# Patient Record
Sex: FEMALE | Race: WHITE | NOT HISPANIC OR LATINO | Employment: UNEMPLOYED | URBAN - METROPOLITAN AREA
[De-identification: names, ages, dates, MRNs, and addresses within clinical notes are randomized per-mention and may not be internally consistent; named-entity substitution may affect disease eponyms.]

---

## 2017-01-31 ENCOUNTER — HOSPITAL ENCOUNTER (OUTPATIENT)
Dept: BEHAVIORAL HEALTH | Facility: CLINIC | Age: 27
Discharge: HOME | End: 2017-01-31
Attending: SOCIAL WORKER | Admitting: SOCIAL WORKER
Payer: COMMERCIAL

## 2017-01-31 VITALS — BODY MASS INDEX: 21.16 KG/M2 | HEIGHT: 62 IN | WEIGHT: 115 LBS

## 2017-01-31 PROCEDURE — H0001 ALCOHOL AND/OR DRUG ASSESS: HCPCS

## 2017-01-31 RX ORDER — BUPRENORPHINE AND NALOXONE 8; 2 MG/1; MG/1
1 FILM, SOLUBLE BUCCAL; SUBLINGUAL 3 TIMES DAILY
COMMUNITY

## 2017-01-31 ASSESSMENT — ANXIETY QUESTIONNAIRES
2. NOT BEING ABLE TO STOP OR CONTROL WORRYING: NEARLY EVERY DAY
7. FEELING AFRAID AS IF SOMETHING AWFUL MIGHT HAPPEN: MORE THAN HALF THE DAYS
3. WORRYING TOO MUCH ABOUT DIFFERENT THINGS: MORE THAN HALF THE DAYS
4. TROUBLE RELAXING: NEARLY EVERY DAY
1. FEELING NERVOUS, ANXIOUS, OR ON EDGE: NEARLY EVERY DAY
GAD7 TOTAL SCORE: 16
5. BEING SO RESTLESS THAT IT IS HARD TO SIT STILL: SEVERAL DAYS
6. BECOMING EASILY ANNOYED OR IRRITABLE: MORE THAN HALF THE DAYS

## 2017-01-31 ASSESSMENT — PAIN SCALES - GENERAL: PAINLEVEL: NO PAIN (0)

## 2017-01-31 NOTE — PROGRESS NOTES
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 program? NA    Criteria for Diagnosis     Criteria for Diagnosis  DSM-5 Criteria for Substance Use Disorder  Instructions: Determine whether the client currently meets the criteria for Substance Use Disorder using the diagnostic criteria in the DSM-V pp.481-587. Current means during the most recent 12 months outside a facility that controls access to substances    Category of Substance Severity (ICD-10 Code / DSM 5 Code)     Alcohol Use Disorder Mild  (F10.10) (305.00)   Cannabis Use Disorder Severe   (F12.20) (304.30)   Hallucinogen Use Disorder NA   Inhalant Use Disorder NA   Opioid Use Disorder Severe   (F11.20) (304.00)    Sedative, Hypnotic, or Anxiolytic Use Disorder NA   Stimulant Related Disorder Severe   (F14.20) (304.20) Cocaine   Tobacco Use Disorder Mild    (Z72.0) (305.1)   Other (or unknown) Substance Use Disorder NA       Collateral Contact Summary   Number of contacts made: 2    Contact with referring person:  Yes.    If court related records were reviewed, summarize here: NA    Information from collateral contacts supported/largely agreed with information from the client and associated risk  ratings.      Rule 25 Assessment Summary and Plan   's Recommendation    Client is recommended to attend the [place] outpatient chemical dependency and DBT program.    UPDATE 2/9/17: Client is recommended to attend the [facility] or [facility] treatment program.      Collateral Contacts     Name:    Sol Rosado   Relationship:    [facility]   Phone Number:    [phone] Releases:    Yes     Voicemail left 3:22pm.      Collateral Contacts     Name:    Waqar Pedersen   Relationship:    boyfriend   Phone Number:    [phone]   Releases:    Yes     Known her 3 years.  I think the benzos are more than she has said.  Her life is absolutely falling apart.  I am just worried about her.  I actually called her PO which is part of why we are here.  Has not been able to bounce back since relapse of heroin (2015)    ollateral Contacts      A problematic pattern of alcohol/drug use leading to clinically significant impairment or distress, as manifested by at least two of the following, occurring within a 12-month period:    Alcohol/drug is often taken in larger amounts or over a longer period than was intended.  There is a persistent desire or unsuccessful efforts to cut down or control alcohol/drug use  A great deal of time is spent in activities necessary to obtain alcohol, use alcohol, or recover from its effects.  Continued alcohol use despite having persistent or recurrent social or interpersonal problems caused or exacerbated by the effects of alcohol/drug.  Important social, occupational, or recreational activities are given up or reduced because of alcohol/drug use.  Alcohol/drug use is continued despite knowledge of having a persistent or recurrent physical or psychological problem that is likely to have been caused or exacerbated by alcohol.  Tolerance, as defined by either of the following: A need for markedly increased amounts of alcohol/drug to achieve  intoxication or desired effect. and A markedly diminished effect with continued use of the same amount of alcohol/drug.  Withdrawal, as manifested by either of the following: The characteristic withdrawal syndrome for alcohol/drug (refer to Criteria A and B of the criteria set for alcohol/drug withdrawal). and Alcohol/drug (or a closely related substance, such as a benzodiazepine) is taken to relieve or avoid withdrawal symptoms.      Specify if: In early remission:  After full criteria for alcohol/drug use disorder were previously met, none of the criteria for alcohol/drug use disorder have been met for at least 3 months but for less than 12 months (with the exception that Criterion A4,  Craving or a strong desire or urge to use alcohol/drug  may be met).     In sustained remission:   After full criteria for alcohol use disorder were previously met, non of the criteria for alcohol/drug use disorder have been met at any time during a period of 12 months or longer (with the exception that Criterion A4,  Craving or strong desire or urge to use alcohol/drug  may be met).   Specify if:   This additional specifier is used if the individual is in an environment where access to alcohol is restricted.    Mild: Presence of 2-3 symptoms    Moderate: Presence of 4-5 symptoms    Severe: Presence of 6 or more symptoms

## 2017-01-31 NOTE — PROGRESS NOTES
[address] [address]               ADULT CD ASSESSMENT      Additional Clinical Questions - Outpatient    Patient Name: Jennifer Stanford  Cell Phone:   Home: [phone] (home)    Mobile:   No relevant phone numbers on file.       Email:  [email or web address]  Emergency Contact: Waqar Pedersen   Tel: [phone]    ________________________________________________________________________      The patient is  Living with a partner    With which race do you identify? White    Please list your family members and if they are living or , i.e. (grandparents, parents, step-parents, adoptive parents, number of siblings, half-siblings, etc.)     Mother   Living Father Living   No Step-mother   NA No Step-father NA   Maternal Grandmother    Fraternal Grandmother    Maternal Grandfather    Living Fraternal Grandfather    1 Sister(s) Living No Brother(s)   NA   1 Half-sister(s)   Living 2 Half-brother(s) Living             Who raised you? (parents, grandparents, adoptive parents, step-parents, etc.)    Both Parents    Have any of your family members or significant others had problems with mental illness or substance abuse?  Please explain.    My parents both used all growing up.    Do you have any children or Stepchildren? No    Are you being investigated by Child Protection Services? n/a    Do you have a child protection worker, probation office or ? No    How would you describe your current finances?  In serious debt    If you are having problems, (unpaid bills, bankruptcy, IRS problems) please explain:  Yes, please explain: I ran up credit while not being employed recently.  Also, my partner has had to loan money to make our bills.    If working or a student are you able to function appropriately in that setting? Yes    Describe your preferred learning style:  by watching someone else demonstrate    What personal  strengths do you have that can help you get sober?  Good support.  God's love.  Determination, memories of exactly what I need to be doing again.    Do you currently self-administer your medications?  Yes    Have you ever:    Had to lie to people important to you about how much you neal?     No     Felt the need to bet more and more money?      No     Attempted treatment for a gambling problem?        No     Touched or fondled someone else inappropriately, or forced them to have sex with you against their will?       No     Are you or have you ever been a registered sex offender?        No     Is there any history of sexual abuse in your family?        No     Jen[place] obsessed by your sexual behavior (having sex with many partners, masturbating often, using pornography often?        No     Received therapy or stayed in the hospital for mental health problems?        No     Hurt yourself (cutting, burning or hitting yourself)?        No     Purged, binged or restricted yourself as a way to control your weight?      No       Are you on a special diet?       No       Do you have any concerns regarding your nutritional status?        No       Have you had any appetite changes in the last 3 months?        Yes, If yes explain: lost weight, stress       Have you had any weight loss or weight gain in the last 3 months?  If yes, how much gain or loss:     If weight patient gains more than 10 lbs or loses more than 10 lbs, refer to program RN /  Attending Physician for assessment.    Yes, If yes explain: loss        Was the patient informed of BMI?      Normal, No Intervention   Yes     Do you have any dental problems?        No     Lived through any trauma or stressful events?        Yes, If yes explain: many losses of people (overdoses), .     In the past month, have you had any of the following symptoms related to the trauma listed above? (Dreams, intense memories, flashbacks, physical reactions, etc.)         Yes,  If yes explain: sudden emotional flashbacks     Believed that people are spying on you, or that someone was plotting against you or trying to hurt you?       No     Believed that someone was reading your mind or could hear your thoughts or that you could actually read someone's mind, hear what another person was thinking?       No     Believed that someone or some force outside of yourself put thoughts in your mind that were not your own, or made you act in a way that was not your usual self?  Or have you ever thought you were possessed?         No     Believed that you were being sent special messages through the TV, radio or newspaper?         No     [place] things other people couldn't hear, such as voices?         No     Had visions when you were awake?  Or have you ever seen things other people couldn't see?       No         Suicide Screening Questions:    1. Are you feeling hopeless about the present/future?   No   2. Have you ever had thoughts about taking your life?   No   3. When did you have these thoughts? NA   4. Do you have any current intent or active desire to take your life?   No   5. Do you have a plan to take your life?    No   6. Have you ever made a suicide attempt?   No   7. Do you have access to pills, guns or other methods to kill yourself?   No       Risk Status - Use as Guide/Example    Ideation - Active  Thoughts of suicide Intent to follow  Through on suicide Plan for completing  suicide    Yes No Yes No Yes No   Emergent X  X  X    Urgent / Non-Emergent X  X   X   Non-Urgent X   X  X   No Current / Active Risk (Past 6 Months)  X  X  X   Jennifer Stanford No No No       Additional Risk Factors: Tendency to be socially isolated and/or cut off from the support of others  A recent death of someone close to the patient and/or unresolved grief and loss issues  Substance abuse   Protective Factors:  Having easy access to supportive family members  Having cultural, Mosque or spiritual  "beliefs that discourage suicide     Risk Status:    Emergent? No  Urgent / Non-Emergent?  No  Present / Non- Urgent? No      No Current Risk? Yes, Evaluation Counselors - Document in Epic / SBAR to counselor \"No identified risk\" and Treatment Counselors - Assess weekly in progress notes under Dimension 3 and summarize in Discharge / Treatment summary under Dimension 3.    Additional information to support suicide risk rating: See Above    Mental Status Assessment    Physical Appearance/Attire:  Appears stated age and Attire appropriate to age/situation  Hygiene:  well groomed  Eye Contact:  at examiner  Speech:  regular  Speech Volume:  regular  Speech Quality: fluid  Cognitive/Perceptual:  reality based  Cognition:  memory intact   Judgment:  intact  Insight:  intact  Orientation:  time, place, person and situation  Thought:  logical   Hallucinations:  none  General Behavioral Tone:  cooperative  Psychomotor Activity:  no problem noted  Gait:  no problem  Mood:  appropriate  Affect:  congruence/appropriate    Counselor Notes: NA    Criteria for Diagnosis  DSM-5 Criteria for Substance Abuse    305.00 (F10.10) Alcohol Use Disorder Mild  304.30 (F12.20) Cannabis Use Disorder Severe  304.00 (F11.20) Opioid Use Disorder Severe  304.20 (F14.20) Cocaine Use Disorder Severe    LEVEL OF CARE    Intoxication and Withdrawal: 0  Biomedical:  0  Emotional and Behavioral:  2  Readiness to Change:  2  Relapse Potential: 3  Recovery Environmental:  3    Initial problem list:    The patient has poor coping skills    Patient/Client is willing to follow treatment recommendations.  Yes    Agustina Del Real [facility]       Vulnerable Adult Checklist for OUTPATIENTS     1.  Do you have a physical, emotional or mental infirmity or dysfunction?       No    2.  Does this issue impair your ability to provide for your own care without help, including providing yourself with food, shelter, clothing, healthcare or supervision?       No    3.  " Because of this issue, I need assistance to protect myself from maltreatment by others.      No    Based on the above information:    This person is not a functional Vulnerable Adult according to [place] Statute 626.5572 subdivision 21.

## 2017-02-01 ASSESSMENT — ANXIETY QUESTIONNAIRES: GAD7 TOTAL SCORE: 16

## 2017-02-01 ASSESSMENT — PATIENT HEALTH QUESTIONNAIRE - PHQ9: SUM OF ALL RESPONSES TO PHQ QUESTIONS 1-9: 17

## 2017-02-01 NOTE — PROGRESS NOTES
CHEMICAL DEPENDENCY ASSESSMENT      DATE OF EVALUATION:  01/31/2017   EVALUATION COUNSELOR:  Agustina Del Real [facility]   CLIENT'S ADDRESS:  [address] [place] [address].   TELEPHONE:  [phone].   STATISTICS:  Date of Birth 1990.  Age:  27.  Sex:  Female.  Marital Status:  Single.   INSURANCE:  [facility].   REFERRAL SOURCE:  Self.      REASON FOR EVALUATION:  Jennifer Stanford reports she has been in and out of treatment for many years.  She does have legal issues, has had times of sobriety but struggled with relapse and most recently has been escalating and is seeking treatment at this time.  UPDATE 2/9/17:  Obviously Waqar and I had some issues going on in our home, this last weekend  More things happened and I ended up getting arrested so now am not going back there and think I need inpatient.  I have continued to use up to getting arrested.     HEALTH HISTORY AND MEDICATIONS:  Client denies any chronic health conditions.  She has a primary care provider through [facility].  Her current medications are Synthroid as well as Suboxone and her Suboxone provider is from [facility], Dr. Mcginnis.      HISTORY OF PREVIOUS TREATMENT AND COUNSELING:  Client reports approximately 7-8 detox admissions, most recently was at [facility] in 2013.  She reports 1 past hospitalization in 2010 due to abscess from intravenous drug use.  Denies any current individual counseling or therapy.  She has been to 7 to 8 treatment programs, most recently in 2013, had about a year stint where she went from [facility] to [facility] for 30 days and then to [facility] for 180 days, followed up by sober living.  Her first program was at age of 17 at [facility].  She reports she has not been attending any support groups in over 2 years.      HISTORY OF ALCOHOL AND DRUG USE:  Client identifies currently using crack cocaine,  age of first use 22.  Reports prior to the past 3 months she  had not used crack cocaine since about the age of 22 and now most recently in the past 3 months, she has been using it near daily about a half a gram a day, last use 01/28/2017.  Client does have a history of heroin and opiate use, age of first use 18.  She reports she is currently on Suboxone maintenance, her last use of intravenous heroin was in 07/2015, she had a 2-month relapse in 2015.  Otherwise she has been on Suboxone since 2013.  Reports periods of on and off heroin use since the age of 18.  Prior to age 18 she had been using pills since age 14, but none since she began heroin.  Client also reports marijuana use, age of first use 10; reports the past 3 months she has been smoking marijuana daily about a bowl a day.  Prior to that, she was smoking it about 1-2 times a month, she would take a couple hits of marijuana.  She does reports some increase in alcohol use, age of first use 10.  Reports for the past 3 months she has been drinking about 3 times a week, usually 2 glasses of wine.  Prior to 3 months ago she has been drinking less and she denies any other heavy use periods of alcohol.  Last use of 01/30/2017.  Client does report recent benzodiazepine use, age of first use 14; reports the past 4 months she has been using only twice but prior to that, she had about a year and a half where she was using monthly at least twice a month, last use 01/24/2017 and client currently uses an E-cigarette.  Client denies any other substance use.      SUMMARY OF CHEMICAL DEPENDENCY SYMPTOMS ACKNOWLEDGED BY CLIENT:  Client identifies with 8 out of the 11 DSM-V criteria for impression of a substance use disorder.      SUMMARY OF COLLATERAL DATA:  Client's boyfriend, Waqar Pedersen sat in on evaluation.  He reports escalation in client's use over the past year.  He reports ever since her relapse on heroin in 2015 things have not seemed to be able to get better since then.  She has legal issues.  He is concerned about her  and feels things are falling apart.  Also client signed a release of information for , Sol Khalil and a voicemail was left and client also signed release of information for Dr. Mcginnis with [facility].      MENTAL STATUS ASSESSMENT:  Physical appearance and attire:  Appears stated age, attire appropriate to age and situation.  Hygiene well groomed.  Eye contact at examiner.  Speech regular, volume regular, quality fluid.  Cognitive perceptual reality based.  Cognition:  Memory intact, judgment intact, insight intact.  Orientation to time, place, person and situation.  Thought logical.  Hallucinations:  None.  General behavioral tone:  Cooperative.  Psychomotor activity:  No problem noted.  Gait:  No problem.  Mood appropriate.  Affect congruent and appropriate.      VULNERABLE ADULT ASSESSMENT:  This person is not a functional vulnerable adult according to [place] statute 626.5572, subdivision 21.      DIAGNOSTIC IMPRESSION:     1.  F10.20, alcohol use disorder, mild.     2.  F12.20, cannabis use disorder, severe.    3.  F11.20, opioid use disorder, severe.   4.  F14.20, cocaine use disorder, severe.      [facility] PLACEMENT CRITERIA:   DIMENSION 1:  Intoxication withdrawal:  Risk level 0.  No signs or symptoms of intoxication or withdrawal observed.  Client reports marijuana use this day, she is on medication assisted therapy.      DIMENSION 2:  Biomedical conditions:  Risk level 0.  No health concerns reported.  Client does have a primary care provider.      DIMENSION 3:  Emotional/Behavioral:  Risk level 2.  Client denies any formal mental health diagnosis, but does report depression, anxiety symptoms.  She denies any suicidal ideation or history of suicidal ideation or suicide attempts.  PHQ-9 score 7, referral for additional mental health screening.      DIMENSION 4:  Readiness to Change:  Risk level 2.  Client expresses a desire for sober lifestyle but has not followed  through in recovery behaviors.      DIMENSION 5:  Relapse and Continued Use Potential:  Risk level 4.  The client has had multiple treatments and has had some sobriety.  She is currently on medication assisted therapy but has cross addiction issues.  She does not utilize support groups and lacks application of daily sober living skills.  UPDATE:  Client did not follow through with attending recommended treatment program, she has continued to use and is now homeless.     DIMENSION 6:  Recovery Environment:  Risk level 3.  The client is currently living with her boyfriend who is supportive.  She is unemployed.  She has minimal other relationships and lacks sober peer network.  She is on probation and has significant legal issues and history.        INITIAL PROBLEM LIST:  Client reports coping skills.      RECOMMENDATIONS:  Client is recommended to attend the [place] outpatient chemical dependency and DBT program.  UPDATE 17: Client is recommended to attend the [facility] program or [facility] treatment program.         This information has been disclosed to you from records protected by Federal confidentiality rules (42 CFR part 2). The Federal rules prohibit you from making any further disclosure of this information unless further disclosure is expressly permitted by the written consent of the person to whom it pertains or as otherwise permitted by 42 CFR part 2. A general authorization for the release of medical or other information is NOT sufficient for this purpose. The Federal rules restrict any use of the information to criminally investigate or prosecute any alcohol or drug abuse patient.      [facility]             D: 2017 16:00   T: 2017 22:41   MT: CT      Name:     PUSHPA ROSE   MRN:      [id number]        Account:      [id number]   :      1990           Visit Date:   2017      Document: [id number]

## 2018-11-26 ENCOUNTER — TRANSFERRED RECORDS (OUTPATIENT)
Dept: HEALTH INFORMATION MANAGEMENT | Facility: CLINIC | Age: 28
End: 2018-11-26

## 2018-12-31 ENCOUNTER — HOSPITAL ENCOUNTER (OUTPATIENT)
Dept: BEHAVIORAL HEALTH | Facility: CLINIC | Age: 28
Discharge: HOME | End: 2018-12-31
Attending: SOCIAL WORKER | Admitting: SOCIAL WORKER
Payer: COMMERCIAL

## 2018-12-31 VITALS
SYSTOLIC BLOOD PRESSURE: 104 MMHG | HEART RATE: 96 BPM | WEIGHT: 148 LBS | HEIGHT: 62 IN | DIASTOLIC BLOOD PRESSURE: 83 MMHG | BODY MASS INDEX: 27.23 KG/M2

## 2018-12-31 PROCEDURE — H0001 ALCOHOL AND/OR DRUG ASSESS: HCPCS

## 2018-12-31 RX ORDER — FAMOTIDINE 10 MG
10 TABLET ORAL 2 TIMES DAILY
COMMUNITY

## 2018-12-31 ASSESSMENT — ANXIETY QUESTIONNAIRES
1. FEELING NERVOUS, ANXIOUS, OR ON EDGE: SEVERAL DAYS
GAD7 TOTAL SCORE: 5
7. FEELING AFRAID AS IF SOMETHING AWFUL MIGHT HAPPEN: SEVERAL DAYS
2. NOT BEING ABLE TO STOP OR CONTROL WORRYING: SEVERAL DAYS
5. BEING SO RESTLESS THAT IT IS HARD TO SIT STILL: NOT AT ALL
3. WORRYING TOO MUCH ABOUT DIFFERENT THINGS: SEVERAL DAYS
6. BECOMING EASILY ANNOYED OR IRRITABLE: SEVERAL DAYS
4. TROUBLE RELAXING: NOT AT ALL

## 2018-12-31 ASSESSMENT — PAIN SCALES - GENERAL: PAINLEVEL: NO PAIN (0)

## 2018-12-31 ASSESSMENT — MIFFLIN-ST. JEOR: SCORE: 1344.57
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
## 2019-01-03 ASSESSMENT — PATIENT HEALTH QUESTIONNAIRE - PHQ9: SUM OF ALL RESPONSES TO PHQ QUESTIONS 1-9: 4

## 2019-01-04 ASSESSMENT — ANXIETY QUESTIONNAIRES: GAD7 TOTAL SCORE: 5

## 2019-01-14 ENCOUNTER — NURSE TRIAGE (OUTPATIENT)
Dept: NURSING | Facility: CLINIC | Age: 29
End: 2019-01-14

## 2019-01-15 NOTE — TELEPHONE ENCOUNTER
Shefali with sensation of salty taste in mouth since yesterday.  No other symptoms.  Does have OV with PCP tomorrow.          Reason for Disposition    All other mouth symptoms (Exceptions: dry mouth from not drinking enough liquids, chapped lips)    Protocols used: MOUTH SYMPTOMS-ADULT-AH

## 2019-02-04 ENCOUNTER — HOSPITAL ENCOUNTER (OUTPATIENT)
Dept: BEHAVIORAL HEALTH | Facility: CLINIC | Age: 29
End: 2019-02-04
Attending: SOCIAL WORKER
Payer: COMMERCIAL

## 2019-02-04 PROCEDURE — H2035 A/D TX PROGRAM, PER HOUR: HCPCS

## 2019-02-04 PROCEDURE — H2035 A/D TX PROGRAM, PER HOUR: HCPCS | Mod: HQ

## 2019-02-04 NOTE — PROGRESS NOTES
"    Initial Services Plan          Immediate health & safety concerns: none noted    Suggestions for client during the time between intake & completion of treatment plan:  Tour your treatment center (unit or outpatient clinic).  Introduce yourself to the treatment group.  Spend time getting to know your peers.  Complete the goals list for your treatment plan.  Review your patient or client handbook.  Identify concerns about whom to ask for family week, and scheduling.  Look for a support network (such as AA, NA, DBT group, a Sikhism group, etc.)  Begin completing \"10 Worst Consequences\" assignment.    Client issues to be addressed in the first treatment sessions:  Identify motivations(s) for coming to treatment, i.e. legal, family, self  Identify concerns about coming into treatment, i.e. fear of failing again.  Acclimate client to group and IOP.    KYRA Saucedo  2/4/2019  4:13 PM  "

## 2019-02-04 NOTE — PROGRESS NOTES
Patient:  Jennifer Stanford    Date: February 4, 2019    Comprehensive Assessment UPDATE        Comprehensive Summary Update and Review  Counselor met with patient on 2/04/2019 and reviewed the Comprehensive Assessment.    There were no changes/updates identified by patient or in chart entries.        Have you ever wished you were dead or that you could go to sleep and not wake up? Past Month?  NO      Have you actually had any thoughts of killing yourself?   Past Month? NO    Have you been thinking about how you might do this?   Past Month?  N/A  Lifetime?   N/A     Have you had these thoughts and had some intention of acting on them?   Past Month?  N/A  Lifetime?   N/A     Have you started to work out the details of how to kill yourself?  Past Month?  N/A  Lifetime?   N/A     Do you intend to carry out this plan?   N/A     When you have the thoughts how long do they last?   N/A    Are there things - anyone or anything (ie Family, Jew, pain of death) that stopped you from wanting to die or acting on thoughts of suicide?   Does not apply        2008  [employer]  Used with permission by Estefania Trujillo, PhD.

## 2019-02-05 PROBLEM — F19.20 CHEMICAL DEPENDENCY (H): Status: ACTIVE | Noted: 2019-02-05

## 2019-02-05 ASSESSMENT — ANXIETY QUESTIONNAIRES
7. FEELING AFRAID AS IF SOMETHING AWFUL MIGHT HAPPEN: NOT AT ALL
GAD7 TOTAL SCORE: 4
2. NOT BEING ABLE TO STOP OR CONTROL WORRYING: SEVERAL DAYS
1. FEELING NERVOUS, ANXIOUS, OR ON EDGE: SEVERAL DAYS
5. BEING SO RESTLESS THAT IT IS HARD TO SIT STILL: NOT AT ALL
6. BECOMING EASILY ANNOYED OR IRRITABLE: SEVERAL DAYS
3. WORRYING TOO MUCH ABOUT DIFFERENT THINGS: SEVERAL DAYS

## 2019-02-05 ASSESSMENT — PATIENT HEALTH QUESTIONNAIRE - PHQ9
5. POOR APPETITE OR OVEREATING: NOT AT ALL
SUM OF ALL RESPONSES TO PHQ QUESTIONS 1-9: 3

## 2019-02-06 ENCOUNTER — HOSPITAL ENCOUNTER (OUTPATIENT)
Dept: BEHAVIORAL HEALTH | Facility: CLINIC | Age: 29
End: 2019-02-06
Attending: SOCIAL WORKER
Payer: COMMERCIAL

## 2019-02-06 PROCEDURE — H2035 A/D TX PROGRAM, PER HOUR: HCPCS | Mod: HQ

## 2019-02-06 PROCEDURE — H2035 A/D TX PROGRAM, PER HOUR: HCPCS

## 2019-02-06 ASSESSMENT — ANXIETY QUESTIONNAIRES: GAD7 TOTAL SCORE: 4

## 2019-02-06 NOTE — PROGRESS NOTES
Patient Safety Plan Template    Name:   Jennifer Stanford YOB: 1990 Age:  29 year old MR Number:  [id number]   Step 1: Warning signs (Thoughts, images, mood, situation, behavior) that a crisis may be developin. Getting stuck in my head - down, depressed, bored, hopeless     2. Angry - f it!     3. Feeling grandiose or on top of the world     Step 2: Internal coping strategies - Things I can do to take my mind off of my problems without contacting another person (relaxation technique, physical activity):     1. You tube - youth channels positivity     2. Meetings/sober activity, volunteering, service work     3. Dog park, errand running, crafts, shopping     Step 3: People and social settings that provide distraction:     1. Name: Kathryn Butler   Phone: [phone]   2. Name: Yasmine   Phone: [phone]   3. Place: dog park   4. Place: sisters     The one thing that is most important to me and worth living for is: God     Step 4: People whom I can ask for help:     1. Name: Kelsey   Phone: [phone]     2. Name: Kathryn   Phone: [phone]     3. Name: Edson   Phone: [phone]     Step 5: Professionals or agencies I can contact during a crisis:     1. Clinician Name: Dr. Mcginnis   Phone: [phone]   Clinician Pager or Emergency Contact #: NA     2. Clinician Name: Edson Lopez [facility]   Phone: [phone]     Clinician Pager or Emergency Contact #: NA     3. Local Urgent Care Services: [facility]/emergency room    Urgent Care Services Address: [facility] [address] [place] [address]    Urgent Care Services Phone: [phone]     4. Suicide Prevention Lifeline Phone: [phone]-BAEA ([phone])     Step 6: Making the environment safe:     1. Keeping the home peaceful/no fighting dirty     2. Doing things that are productive     Safety Plan Template 2008 Rossy Landaverde and Vicente Plascencia is reprinted with the express permission of the authors.  No portion of the Safety Plan  Template may be reproduced without the express, written permission.  You can contact the authors at [email or web address] or [email or web address].
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
## 2019-02-07 NOTE — PROGRESS NOTES
Excused absence Thursday because Mayda Senior's group was cancelled due to weather.  See weekly progress note for assessment.     KYRA Best

## 2019-02-07 NOTE — PROGRESS NOTES
CD ADULT Progress Note     Underlined portions in a dimension indicate important information carried forward from week to week as a reminder.     Treatment Plan Review completed on:  2/7/2019     Attendance Dates:  2/4/19 and 2/6/2019     Total # of Group Sessions:  Phase I:  2 plus service initiation and treatment planning sessions    Length of stay/projected discharge date: 6/13/2019 MONDAY TUESDAY WEDNESDAY THURSDAY FRIDAY SATURDAY SUNDAY Total   Group Therapy 2 hours  2 hours 0 hours    4 hours   Specialty Groups*  0 hours      0 hours   1:1 1 hour  1 hour     2 hours   Family Program           [place]             Phase III             Absent           Total 3 hours 0 hours 3 hours 0 hours    6 hours     *Specialty Groups include Mental Health Care, Assertiveness and Communication, Sobriety Maintenance Skills, Spiritual Care, Stress Management, Relapse Prevention, Family Systems.                    Learning Style:  by reading    Staff member contributing: KYRA Best     Received supervision:  Staffed with KYRA Castellanos    Client: contributed to goals and plan    Did Client receive a copy of treatment plan/revised plan: Yes; signed 2/6/2019    Changes to Treatment Plan: No    Client agrees with plan/revised plan: Yes    Any changes in Vulnerable Adult Status?  No   (If yes, add to treatment plan and individual abuse prevention plan).    Substance Use Disorders:    Opioid Use Disorder, Severe   (F11.20) (304.00)  Sedative, Hypnotic, or Anxiolytic Use Disorder, Severe  (F13.20) (304.10)  Stimulant Related Disorder, Severe   (F15.20) (304.40) Amphetamine type substance  Tobacco Use Disorder, Moderate   (F17.200) (305.1)    1) Care Coordination Activities:  Recommended couples therapy; Inquire for available appt times with ZHANNA Ely, PO  2) Medical, Mental Health and other appointments the client attended: none  3) Medication issues: suboxone maintenance 20 mg/daily  4) Physical and mental  health problems: See dimensions 2 and 3 below for further details.  5) Review and evaluation of the individual abuse prevention plan: NA    ASAM Risk Ratings and Data       DIMENSION 1: Acute Intoxication/Withdrawal  The client's ability to cope with withdrawal symptoms and current state of intoxication       Acute Intoxication/Withdrawal - Current Risk Factor:  1    Reporting sober date of 10/25/2018    Goals:  Develop effective strategies to maintain sobriety.  Continue Suboxone maintenance.  Report to counselor and group any alcohol or substance use.     Data: Client reports last use of heroin, methamphetamine and benzodiazapine as 10/24/2018.  She is on Suboxone maintenance of 20 mg daily.  Client exhibits no signs of intoxication or withdrawal.    DIMENSION 2:  Biomedical Conditions and Complaints  The degree to which any physical disorder would interfere with treatment for substance abuse and the client's ability to tolerate any related discomfort     Biomedical Conditions and Complaints - Current Risk Factor:  0    Goals:  Obtain a neurological examination.  Disclose CD status to medical providers and follow up with medical interventions while in IOP.  Maintain good physical health.    Data: Client denies chronic biomedical conditions which would interfere with treatment.  She reports medical conditions of hypothyroidism, acid reflux, allergy to bees, and periodic limited and minor seizure episodes without loss of consciousness.  Client reports having a PCP and sees an addiction specialist monthly.  She reports medication compliance with Levothyroxine, Famotidine, and Suboxone. She takes 2.5 8mg strips of Suboxone per day and has since 2013.     Client denies any new or worsening medical conditions.      DIMENSION 3:  Emotional/Behavioral/Cognitive Conditions and Complications  The degree to which any condition or complications are likely to interfere with treatment for substance abuse or with function in  "significant life areas and the likelihood of risk of harm to self or others.     Emotional/Behavioral - Current Risk Factor:  2    DSM-5 Diagnoses:   Client reports feelings of anxiety, depression, trauma and grief and loss.  Therapist will do a diagnostic interview to update information       Suicide Assessment: very low risk  Risk Status    Ideation - Active thoughts of suicide Intent to follow through on suicide Plan for completing suicide    Yes No Yes No Yes No   Emergent  x  x  x   Urgent / Non-Emergent  x  x  x   Non- Urgent  x  x  x   No Current/Active Risk  x  x  x          Goals: Learn how to apply self-care and psychotherapy to build a repertoire of daily habits that counteract PAWS, fatigue, depression and anxiety leading to increased resiliency and well-being.  Schedule a mental health diagnostic assessment.  Understand the relationship between mental health concerns and addiction.     Data: Client reports mental health concerns of anxiety, depression, trauma and grief and loss.  Client denies current or past mental health diagnoses or psychotropic medications.  EMR indicates past diagnosis of anxiety and historical medication with Lexapro.  On 2/04/2019, client s PHQ-9 score was 3 out of 27, indicating minimal depression, and ZAID-7 score was 4 out of 21, indicating minimal anxiety.  Client's protective factors include:       Having people in his/her life that would prevent the patient from considering a suicide attempt (i.e. young children, spouse, parents, etc.)     Having easy access to supportive family members.     Client rates her depression, anxiety, or other mental health concerns as 6 of 10 (10 highest), due to \"relationship issues with my significant other\".  Client denies any thoughts of hurting herself or others.  Client states she is feeling anxious due to being in a new group and due to her personal life.  ,     DIMENSION 4:  Readiness to Change  Consider the amount of support and " "encouragement necessary to keep the client involved in treatment.     Readiness to Change - Current Risk Factor:  1    Goals:  Understand the impact your substance use has had on you, your family, and significant relationships.  Increase internal motivation to change.    Data: Client rates her motivation for recovery as 10 of 10 (10 highest).  Client will prepare her first assignment as a way to help increase her motivation.  She states \"I cannot continue dwelling in addiction, stress, depression and constant fear.  It's no way to live\".     DIMENSION 5:  Relapse/Continued Use/Continued Problem Potential  Consider the degree to which the client recognizes relapse issues and has the skills to prevent relapse of either substance use or mental health problems.     Relapse/Continued Use/Continued Problem Potential - Current Risk Factor:  3    Goals:  Identify and understand personal relapse and self-sabotage patterns.  Identify personal triggers and relapse warning signs.  Develop sober coping and living skills in order to address the development of a strategy for long term recovery.    Data: Client reports approximately 15 detox admissions and 7 prior CD treatment experiences with 6 completed.  She reports a 2-year period of sobriety in the past followed by two 1-year periods.  Client identifies a pattern of having a year of sobriety and relapsing for a month.  She reports she has been unable to attain sobriety on her own.  Client rates her strongest craving to use in the past week as 0 of 10 (10 highest) due to \"i've had no thoughts or concerns of using.  The Suboxone helps greatly.  Even when I feel down I'm able to compartmentalize that away from drugs/alcohol\".  Client states \"got on a list for sober housing\" as something she did to change her thinking and behaviors for the sake of her sobriety.  Client reports a personal strength of hopeful, spiritual.  She reports using a coping skill of signed up for more " "accountability.    DIMENSION 6:  Recovery Environment  Consider the degree to which key areas of the client's life are supportive of or antagonistic to treatment participation and recovery.     Recovery Environment - Current Risk Factor:  3    Support group attended this week:  Yes    Did family agree to attend family week:  TBD    If yes: NA    Goals:  Develop a sober support network.  Increase sober support network. Identify and attend sober support group meetings.  Heal relationship with boyfriend.    Data: Client is unemployed but seeking work.  Client lives with her non-using boyfriend who she describes as supportive.  Client reports attendance at 3 sober support meetings this week.  Client denies having a sponsor.  Client states things are \"I've been making new friends in recovery.  Attending groups and trying to sort out my relationship\" at home and work.  Client participated in sober activity of \"dog park\".         Intervention:   Behavioral Therapy, Cognitive Behavioral Therapy, Counselor Feedback, Psychoeducation, Emotional management, Group Feedback, Motivational Interviewing, Relapse Prevention, Twelve Step Facilitation, REBT, and DBT    On Monday, client introduced herself and participated with the check- in process and affirmation.   Readiness to Change, Relapse Prevention, and Recovery Planning were addressed by review and further discussion of post-acute withdrawal syndrome (PAWS).   Client participated in group discussion.  She spoke about which symptoms he had experienced and could relate to.  On Tuesday client missed group due to weather conditions.  On Wednesday Readiness to Change, Relapse Prevention, and Recovery Planning were addressed through group peer presentations of their  10 Worst Consequences  and  Relapse Autopsy  assignments with discussion.  Client participated in group discussion and provided supportive feedback.  She expressed ways she could relate to peers' experiences.    Client " "participated in self-soothing exercises.  Client practiced 4x4 and natural mindful breathing.  Client expressed something she was grateful for.  Client affirmed and supported for the courage to seek change in her life.     Assessment:  Stages of Change Model  Contemplation    Plan:   Acclimate client to group and IOP.    Begin working on \"10 Worst Consequences\" assignment.  Practice 4x4 breathing 3 times daily before next session.      Edson Lopez MPS, LADC   "

## 2019-02-11 ENCOUNTER — HOSPITAL ENCOUNTER (OUTPATIENT)
Dept: BEHAVIORAL HEALTH | Facility: CLINIC | Age: 29
End: 2019-02-11
Attending: SOCIAL WORKER
Payer: COMMERCIAL

## 2019-02-11 PROCEDURE — H2035 A/D TX PROGRAM, PER HOUR: HCPCS | Mod: HQ

## 2019-02-12 ENCOUNTER — HOSPITAL ENCOUNTER (OUTPATIENT)
Dept: BEHAVIORAL HEALTH | Facility: CLINIC | Age: 29
End: 2019-02-12
Attending: SOCIAL WORKER
Payer: COMMERCIAL

## 2019-02-12 PROCEDURE — H2035 A/D TX PROGRAM, PER HOUR: HCPCS | Mod: HQ

## 2019-02-13 ENCOUNTER — HOSPITAL ENCOUNTER (OUTPATIENT)
Dept: BEHAVIORAL HEALTH | Facility: CLINIC | Age: 29
End: 2019-02-13
Attending: SOCIAL WORKER
Payer: COMMERCIAL

## 2019-02-13 PROCEDURE — H2035 A/D TX PROGRAM, PER HOUR: HCPCS | Mod: HQ

## 2019-02-15 ENCOUNTER — HOSPITAL ENCOUNTER (OUTPATIENT)
Dept: BEHAVIORAL HEALTH | Facility: CLINIC | Age: 29
End: 2019-02-15
Attending: SOCIAL WORKER
Payer: COMMERCIAL

## 2019-02-15 PROCEDURE — H2035 A/D TX PROGRAM, PER HOUR: HCPCS | Performed by: SOCIAL WORKER
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
## 2019-02-18 ENCOUNTER — HOSPITAL ENCOUNTER (OUTPATIENT)
Dept: BEHAVIORAL HEALTH | Facility: CLINIC | Age: 29
End: 2019-02-18
Attending: SOCIAL WORKER
Payer: COMMERCIAL

## 2019-02-18 PROCEDURE — H2035 A/D TX PROGRAM, PER HOUR: HCPCS | Mod: HQ

## 2019-02-19 ENCOUNTER — HOSPITAL ENCOUNTER (OUTPATIENT)
Dept: BEHAVIORAL HEALTH | Facility: CLINIC | Age: 29
End: 2019-02-19
Attending: SOCIAL WORKER
Payer: COMMERCIAL

## 2019-02-19 PROCEDURE — H2035 A/D TX PROGRAM, PER HOUR: HCPCS | Mod: HQ

## 2019-02-21 ENCOUNTER — HOSPITAL ENCOUNTER (OUTPATIENT)
Dept: BEHAVIORAL HEALTH | Facility: CLINIC | Age: 29
End: 2019-02-21
Attending: SOCIAL WORKER
Payer: COMMERCIAL

## 2019-02-21 PROCEDURE — H2035 A/D TX PROGRAM, PER HOUR: HCPCS | Mod: HQ
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
## 2019-02-25 ENCOUNTER — HOSPITAL ENCOUNTER (OUTPATIENT)
Dept: BEHAVIORAL HEALTH | Facility: CLINIC | Age: 29
End: 2019-02-25
Attending: SOCIAL WORKER
Payer: COMMERCIAL

## 2019-02-25 PROCEDURE — H2035 A/D TX PROGRAM, PER HOUR: HCPCS | Mod: HQ

## 2019-02-26 ENCOUNTER — HOSPITAL ENCOUNTER (OUTPATIENT)
Dept: BEHAVIORAL HEALTH | Facility: CLINIC | Age: 29
End: 2019-02-26
Attending: SOCIAL WORKER
Payer: COMMERCIAL

## 2019-02-26 PROCEDURE — H2035 A/D TX PROGRAM, PER HOUR: HCPCS

## 2019-02-26 PROCEDURE — H2035 A/D TX PROGRAM, PER HOUR: HCPCS | Mod: HQ

## 2019-02-27 ENCOUNTER — HOSPITAL ENCOUNTER (OUTPATIENT)
Dept: BEHAVIORAL HEALTH | Facility: CLINIC | Age: 29
End: 2019-02-27
Attending: SOCIAL WORKER
Payer: COMMERCIAL

## 2019-02-27 PROCEDURE — H2035 A/D TX PROGRAM, PER HOUR: HCPCS | Mod: HQ

## 2019-02-28 ENCOUNTER — HOSPITAL ENCOUNTER (OUTPATIENT)
Dept: BEHAVIORAL HEALTH | Facility: CLINIC | Age: 29
End: 2019-02-28
Attending: SOCIAL WORKER
Payer: COMMERCIAL

## 2019-02-28 PROCEDURE — H2035 A/D TX PROGRAM, PER HOUR: HCPCS | Mod: HQ

## 2019-02-28 NOTE — PROGRESS NOTES
CD ADULT Progress Note     Underlined portions in a dimension indicate important information carried forward from week to week as a reminder.     Treatment Plan Review completed on:  2/28/2019     Attendance Dates:  2/25/19, 2/26/19, 2/27/19 and 2/28/19     Total # of Group Sessions:  Phase I:  12 plus service initiation and treatment planning sessions    Length of stay/projected discharge date: 6/13/2019 MONDAY TUESDAY WEDNESDAY THURSDAY FRIDAY SATURDAY SUNDAY Total   Group Therapy   2 hours 2 hours    4 hours   Specialty Groups*  2 hours      2 hours   1:1  .5 hour      .5 hour   Family Program 2 hours       2 hours   [place]             Phase III             Absent           Total 2 hours 2.5 hours 2 hours 2 hours    8.5 hours     *Specialty Groups include Mental Health Care, Assertiveness and Communication, Sobriety Maintenance Skills, Spiritual Care, Stress Management, Relapse Prevention, Family Systems.                    Learning Style:  by reading    Staff member contributing: KYRA Best     Received supervision:  Yes, ZHANNA Leigh, PO, KYRA     Client: contributed to goals and plan    Did Client receive a copy of treatment plan/revised plan: Yes; signed 2/6/2019    Changes to Treatment Plan: No    Client agrees with plan/revised plan: Yes    Any changes in Vulnerable Adult Status?  No   (If yes, add to treatment plan and individual abuse prevention plan).    Substance Use Disorders:    Opioid Use Disorder, Severe   (F11.20) (304.00)  Sedative, Hypnotic, or Anxiolytic Use Disorder, Severe  (F13.20) (304.10)  Stimulant Related Disorder, Severe   (F15.20) (304.40) Amphetamine type substance  Tobacco Use Disorder, Moderate   (F17.200) (305.1)    1) Care Coordination Activities:  conferred with ZHANNA Ely, LICSW  2) Medical, Mental Health and other appointments the client attended: none  3) Medication issues: suboxone maintenance 20 mg/daily  4) Physical and mental health problems:  See dimensions 2 and 3 below for further details.  5) Review and evaluation of the individual abuse prevention plan: CHICA    [facility] Risk Ratings and Data       DIMENSION 1: Acute Intoxication/Withdrawal  The client's ability to cope with withdrawal symptoms and current state of intoxication       Acute Intoxication/Withdrawal - Current Risk Factor:  1    Reporting sober date of 10/25/2018    Goals:  Develop effective strategies to maintain sobriety.  Continue Suboxone maintenance.  Report to counselor and group any alcohol or substance use.     Data: Client reports last use of heroin, methamphetamine and benzodiazapine as 10/24/2018.  She is on Suboxone maintenance of 20 mg daily.     Client exhibits no signs of intoxication or withdrawal throughout the week.    DIMENSION 2:  Biomedical Conditions and Complaints  The degree to which any physical disorder would interfere with treatment for substance abuse and the client's ability to tolerate any related discomfort     Biomedical Conditions and Complaints - Current Risk Factor:  0    Goals:  Obtain a neurological examination.  Disclose CD status to medical providers and follow up with medical interventions while in IOP.  Maintain good physical health.    Data: Client denies chronic biomedical conditions which would interfere with treatment.  She reports medical conditions of hypothyroidism, acid reflux, allergy to bees, and periodic limited and minor seizure episodes without loss of consciousness.  Client reports having a PCP and sees an addiction specialist monthly.  She reports medication compliance with Levothyroxine, Famotidine, and Suboxone. She takes 2.5 8mg strips of Suboxone per day and has since 2013.     On Monday client denies any new or worsening medical conditions.  Client reports meeting her exercise goal this week.    DIMENSION 3:  Emotional/Behavioral/Cognitive Conditions and Complications  The degree to which any condition or complications are likely to  "interfere with treatment for substance abuse or with function in significant life areas and the likelihood of risk of harm to self or others.     Emotional/Behavioral - Current Risk Factor:  2    DSM-5 Diagnoses:   Client reports feelings of anxiety, depression, trauma and grief and loss.  Therapist will do a diagnostic interview to update information       Suicide Assessment: very low risk  Risk Status    Ideation - Active thoughts of suicide Intent to follow through on suicide Plan for completing suicide    Yes No Yes No Yes No   Emergent  x  x  x   Urgent / Non-Emergent  x  x  x   Non- Urgent  x  x  x   No Current/Active Risk  x  x  x          Goals: Learn how to apply self-care and psychotherapy to build a repertoire of daily habits that counteract PAWS, fatigue, depression and anxiety leading to increased resiliency and well-being.  Schedule a mental health diagnostic assessment.  Understand the relationship between mental health concerns and addiction.     Data: Client reports mental health concerns of anxiety, depression, trauma and grief and loss.  Client denies current or past mental health diagnoses or psychotropic medications.  EMR indicates past diagnosis of anxiety and historical medication with Lexapro.  On 2/04/2019, client s PHQ-9 score was 3 out of 27, indicating minimal depression, and ZAID-7 score was 4 out of 21, indicating minimal anxiety.  Client's protective factors include:       Having people in his/her life that would prevent the patient from considering a suicide attempt (i.e. young children, spouse, parents, etc.)     Having easy access to supportive family members.     On Tuesday client rates her depression, anxiety, or other mental health concerns as 5 \"with higher moments\" of 10 (10 highest), due to \"relationship stress\".  Client denies any thoughts of hurting herself or others.  Client states she is feeling anxious and apprehensive due to relationship issues.      DIMENSION 4:  " "Readiness to Change  Consider the amount of support and encouragement necessary to keep the client involved in treatment.     Readiness to Change - Current Risk Factor:  1    Goals:  Understand the impact your substance use has had on you, your family, and significant relationships.  Increase internal motivation to change.    Data: On Monday client rates her motivation for recovery as 10 of 10 (10 highest) stating \"I'm more confident in the freedom of recovery than ever\".  Client identified 3 reasons she wants to remain sober.     DIMENSION 5:  Relapse/Continued Use/Continued Problem Potential  Consider the degree to which the client recognizes relapse issues and has the skills to prevent relapse of either substance use or mental health problems.     Relapse/Continued Use/Continued Problem Potential - Current Risk Factor:  3    Goals:  Identify and understand personal relapse and self-sabotage patterns.  Identify personal triggers and relapse warning signs.  Develop sober coping and living skills in order to address the development of a strategy for long term recovery.    Data: Client reports approximately 15 detox admissions and 7 prior CD treatment experiences with 6 completed.  She reports a 2-year period of sobriety in the past followed by two 1-year periods.  Client identifies a pattern of having a year of sobriety and relapsing for a month.  She reports she has been unable to attain sobriety on her own.      On Monday client rates her strongest craving to use in the past week as 0 of 10 (10 highest).  Client states \"talk about things with everyone appropriate\" was something she did to change her thinking and behaviors for the sake of her sobriety.  Client reports a personal strength of determined.  She reports using coping skill of talking to supportive people.    DIMENSION 6:  Recovery Environment  Consider the degree to which key areas of the client's life are supportive of or antagonistic to treatment " "participation and recovery.     Recovery Environment - Current Risk Factor:  3    Support group attended this week:  Yes    Did family agree to attend family week:  yes    If yes: scheduled 2/25/19 and 3/4/19.    Goals:  Develop a sober support network.  Increase sober support network. Identify and attend sober support group meetings.  Heal relationship with boyfriend.    Data: Client is unemployed but seeking work.  Client lives with her non-using boyfriend who she describes as supportive.      On Monday client reports attendance at 2 sober support meetings in the past week.  Client reports having found a temporary sponsor and speaking with her.  Client states \"things are up and down, yet hopeful\" at home, work, and with outside support..  Client participated in sober activity of \"sponsor\".         Intervention:   Behavioral Therapy, Cognitive Behavioral Therapy, Counselor Feedback, Psychoeducation, Emotional management, Group Feedback, Motivational Interviewing, Relapse Prevention, Twelve Step Facilitation, REBT, and DBT    On Monday, client attended Family group with her SO.  On Tuesday, Readiness to Change, Relapse Prevention, and Recovery Planning were addressed.  The video  Talib to Self:  Protecting Sobriety with the Science of Safety  was presented.  On Wednesday these were further addressed through discussion of Recovery Management as presented in that video.  Client expressed that she had the need for her MAT reinforced.   Readiness to change, Relapse Prevention and Recovery Planning were also addressed through group peers' presentation of their  Symptoms  assignments with discussion.  Client participated, provided supportive feedback, and spoke about how she related to behavioral, emotional, and physical symptoms and signs described in peers  assignments.  Client participated in self-soothing exercises.  Client practiced 4x4 and natural mindful breathing.  Client expressed something she was grateful for.  " "Client affirmed and supported for active participation in group.    Assessment:  Stages of Change Model  Contemplation    Plan:   Implement as many \"slices of cheese\" recovery management techniques as possible.  Practice awareness of current state of mind.  Schedule DA with Mayda Alejandro.  Prepare to present second half \"10 Worst Consequences\" assignment.  Practice 4x4 breathing 3 times daily before next session.      BUSTER Best, LADC   "

## 2019-03-01 NOTE — PROGRESS NOTES
"Jennifer Stanford  February 28, 2019  [id number]    [facility] Mental Health Process Group Note    Day and Date: Thursday, 2/28/19     Number of participants:  2     Length of Group:  3 hours, Shefali arrived 35 minutes late and was billed for 2 hours.     Goal of the Group: Clients learn key concepts of traditional CBT (cognitive distortions, conditioning, automatic thoughts, reframing) and then build on these by learning three core concepts of third wave therapies (ACT, DBT, MB): Acceptance, Cognitive Defusion and Being Present. The objective is to increase psychological flexibility and choose behaviors that serve the clients  personal values.      Rating scales are 1-10, with 1 being low and 10 being high or most severe.     [place]: Group Topics and Activities     I.  D3 Intervention and Group Topic addressed: Part 1:  3 Core processes to increase psychological flexibility and increase resiliency for mental and physical health.     II. Mindfulness and/or Motivational Component: Getting Comfortable with Moods, Exploring Affirmations to use this week, Liquid Mood meditation, Awareness of Sounds Meditation      III. Additional Activity:  Three Simple Steps to Acceptance; Using Affirmations to increase Self-Acceptance; Cognitive Defusion exercises     IV. Review of Progress:   A. Client's self-report: Shefali came in 35 minutes late and she said she was caused by traffic. Last week she was also quite a bit late and she was asked to try and come in on time.  She became upset about this but stayed in the group and apologized for this attitude after group. We used this as an example of how our emotions may affect us and how to allow the emotions to be there without \"fusing\" with them so that they can work their way through us. Shefali spoke about struggling with the situation with his SO and not being able to discern if she is right in questioning him or not. We processed through the feelings of vulnerability and betrayal that may " "appear early in recovery. She was interested in this topic. She did do her journal this week and is using deep breathing regularly. She said she sometimes gets palpitations and feels like her chest is tight when she is at home. She was encouraged to get this checked out ASAP and she said she would call her PCP tomorrow. This is only happening when she is home and deep breathing helps this calm down.  Her stress is a 3 and she is not having cravings but when she is anxious she thinks about benzos.    Comments on Liquid Mood:  She described her mood as \"mainly white, I was trying to push the red away.\"      B. Therapist's Assessment: Shefali participates fully in group and was able to overcome her frustration and stay engaged in group. She turned in her DA paperwork and vulunteered to meet me at a different clinic if she could get the DA completed quicker. We scheduled an appointment at the [place] location for Wed, 3/13/19.   Favorite self-acceptance affirmation was \"I can itemize my weaknesses, disadvantages, and failures without judging or defining myself by them.\"      V.   Reflection and evaluation of today s group experience:  Gave verbal feedback during group and completed the evaluation. Comments included: The most important things learned today were  I liked learning about the difference between ego mind or ego self and transcendent \"self,\" observer, wise mind and the differences in thought processes\" and \"Just excited to learn more ways to reapply these methods\" and she was surprised by \"Just how applicable these things are.\"       VI. Assignments or activities to do for next week: Complete the journal and practice mindfulness daily. Try to use cognitive defusion throughout the week on negative thoughts and emotions.    Therapeutic techniques in this session:  Motivational Therapy, CBT/DBT/ACT, Supportive and Mindfulness    Additional Treatment Referrals/Follow-up Issues to Address: Not indicated at this " time.    Change in Treatment Plan: Not indicated at this time. This group completes one of the Tx Plan interventions under D3.      Change in Diagnosis: No changes at this time.

## 2019-03-04 ENCOUNTER — HOSPITAL ENCOUNTER (OUTPATIENT)
Dept: BEHAVIORAL HEALTH | Facility: CLINIC | Age: 29
End: 2019-03-04
Attending: SOCIAL WORKER
Payer: COMMERCIAL

## 2019-03-04 PROCEDURE — H2035 A/D TX PROGRAM, PER HOUR: HCPCS | Mod: HQ

## 2019-03-06 ENCOUNTER — HOSPITAL ENCOUNTER (OUTPATIENT)
Dept: BEHAVIORAL HEALTH | Facility: CLINIC | Age: 29
End: 2019-03-06
Attending: SOCIAL WORKER
Payer: COMMERCIAL

## 2019-03-06 PROCEDURE — H2035 A/D TX PROGRAM, PER HOUR: HCPCS | Mod: HQ

## 2019-03-07 ENCOUNTER — HOSPITAL ENCOUNTER (OUTPATIENT)
Dept: BEHAVIORAL HEALTH | Facility: CLINIC | Age: 29
End: 2019-03-07
Attending: SOCIAL WORKER
Payer: COMMERCIAL

## 2019-03-07 PROCEDURE — H2035 A/D TX PROGRAM, PER HOUR: HCPCS | Mod: HQ

## 2019-03-08 NOTE — PROGRESS NOTES
"Jennifer Stanford  March 7, 2019  [id number]    [facility] Mental Health Process Group Note      Day and Date:  Thursday, 3/07/19     Number of participants:       Length of Group:  3 hours    Goal of the Group: Clients learn key concepts of traditional CBT (cognitive distortions, conditioning, automatic thoughts, reframing) and then build on these by learning three core concepts of third wave therapies (ACT, DBT, MB): Self-as-Context, Values and Committed Action. The objective is to increase psychological flexibility and choose behaviors that serve the clients  personal values.        Rating scales are 1-10, with 1 being low and 10 being high or most severe.     [place]: Group Topics and Activities     I.  D3 Intervention and Group Topic addressed: Mental Health Part 2    II. Mindfulness and/or Motivational Component:  Values and Committed Action, Psychological Flexibility, Excerpts from documentary  I AM  to clarify values and emphasize the importance of community and connection to others.      III. Additional Activity:  Completed examples of  auto-  behavior vs. values based actions, completed the AAQ-II scale; identified personal value to work on this week +  create a  goal that reflects this value + commit to one step toward that goal for this week.       IV. Review of Progress:   A. Client's self-report:  Shefali reported having some using dreams and she said she felt anxious when going home. Her symptoms include \"heart palpitations and I get a lump in my throat.\" She was encouraged to go to her PCP and rule out any physical causes of these symptoms. She has an appt with this therapist for a diagnostic evaluation on 3/13/19.  Shefali reported that things are \"alright\" and that she enjoys her new job and she is looking for houses online as she explained, \"I've come to the realization about my living situation, that it's not good and I want to grow by living independently.\"  She went to the family program with her SO " "and she said she spoke quiet a bit but her SO just said, \"I hope we can stay together.\"  She continues to do her journal and is doing deep breathing and centering herself in the present by doing affirmations and saying \"peace\" to herself when she feels anxious. Shefali said that she feels safe at home.     B. Therapist's Assessment:  Shefali participated fully today and insightful comments from her own experiences.  She had a brighter affect this week and used humor in appropriate ways.       The value chosen to work on this week: \"Respecting others' truths\"  The goal that reflects this value:  \"Listening better and just receiving it\"  The step to take this week toward this goal:   \"Biting my tongue about home. Not saying things that are negative. Instead listening to his perspective.\"     V.   Reflection and evaluation of today s group experience:  Gave verbal feedback and filled out evaluation form. The most important thing learned today was \"The mindfulness \"rule\" again applies to so many areas of life and communication. Including our values and how we strive to grow in them\" and \"Just making sure to be present to really make it applicable at all times\" and she was surprised by \"Just how important human connectivity really is.\"      VI. Assignments or activities to do for next week: Continue to do the journal and mindfulness, practice cognitive defusion, recognize the \"observing self\" and notice what values are reflected in daily choices and decisions.  Follow through on your STEP 1 from today s value s work.     Therapeutic techniques in this session:  Motivational Therapy, CBT/DBT/ACT, Supportive, Behavioral Modification and Mindfulness     Additional Treatment Referrals/Follow-up Issues to Address: Not indicated at this time.      Change in Treatment Plan: Not indicated at this time. This session completes one Tx Plan intervention under D3.       Change in Diagnosis: Not at this time.        "

## 2019-03-08 NOTE — PROGRESS NOTES
CD ADULT Progress Note     Underlined portions in a dimension indicate important information carried forward from week to week as a reminder.     Treatment Plan Review completed on:  3/8/2019     Attendance Dates:  3/4/19, 3/6/19 and 3/7/19     Total # of Group Sessions:  Phase I:  15 plus service initiation and treatment planning sessions    Length of stay/projected discharge date: 6/13/2019 MONDAY TUESDAY WEDNESDAY THURSDAY FRIDAY SATURDAY SUNDAY Total   Group Therapy   2 hours 3 hours    5 hours   Specialty Groups*  0 hours      0 hours   1:1           Family Program 2 hours       2 hours   [place]             Phase III             Absent           Total 2 hours 0 hours 2 hours 3 hours    7 hours     *Specialty Groups include Mental Health Care, Assertiveness and Communication, Sobriety Maintenance Skills, Spiritual Care, Stress Management, Relapse Prevention, Family Systems.                    Learning Style:  by reading    Staff member contributing: KYRA Best     Received supervision:  Yes, ZHANNA Leigh, PO, KYRA     Client: contributed to goals and plan    Did Client receive a copy of treatment plan/revised plan: Yes; signed 2/6/2019    Changes to Treatment Plan: No    Client agrees with plan/revised plan: Yes    Any changes in Vulnerable Adult Status?  No   (If yes, add to treatment plan and individual abuse prevention plan).    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  5) Review and evaluation of the individual abuse prevention plan: [facility]    ASAM Risk Ratings and Data       DIMENSION 1: Acute Intoxication/Withdrawal  The client's ability to cope with withdrawal symptoms and current state of intoxication       Acute Intoxication/Withdrawal - Current Risk Factor:  1    Reporting sober date of 10/25/2018    Goals:  Develop effective strategies to maintain sobriety.  Continue Suboxone maintenance.  Report to counselor and group any alcohol or substance use.     Data: Client reports last use of heroin, methamphetamine and benzodiazapine as 10/24/2018.  She is on Suboxone maintenance of 20 mg daily.     Client exhibits no signs of intoxication or withdrawal throughout the week.    DIMENSION 2:  Biomedical Conditions and Complaints  The degree to which any physical disorder would interfere with treatment for substance abuse and the client's ability to tolerate any related discomfort     Biomedical Conditions and Complaints - Current Risk Factor:  0    Goals:  Obtain a neurological examination.  Disclose CD status to medical providers and follow up with medical interventions while in IOP.  Maintain good physical health.    Data: Client denies chronic biomedical conditions which would interfere with treatment.  She reports medical conditions of hypothyroidism, acid reflux, allergy to bees, and periodic limited and minor seizure episodes without loss of consciousness.  Client reports having a PCP and sees an addiction specialist monthly.  She reports medication compliance with Levothyroxine, Famotidine, and Suboxone. She takes 2.5 8mg strips of Suboxone per day and has since 2013.     On Wednesday client denies any new or worsening medical conditions.  Client reports meeting her exercise goal this week.    DIMENSION 3:  Emotional/Behavioral/Cognitive Conditions and Complications  The degree to which any condition or complications  "are likely to interfere with treatment for substance abuse or with function in significant life areas and the likelihood of risk of harm to self or others.     Emotional/Behavioral - Current Risk Factor:  2    DSM-5 Diagnoses:   Client reports feelings of anxiety, depression, trauma and grief and loss.  Therapist will do a diagnostic interview to update information       Suicide Assessment: very low risk  Risk Status    Ideation - Active thoughts of suicide Intent to follow through on suicide Plan for completing suicide    Yes No Yes No Yes No   Emergent  x  x  x   Urgent / Non-Emergent  x  x  x   Non- Urgent  x  x  x   No Current/Active Risk  x  x  x          Goals: Learn how to apply self-care and psychotherapy to build a repertoire of daily habits that counteract PAWS, fatigue, depression and anxiety leading to increased resiliency and well-being.  Schedule a mental health diagnostic assessment.  Understand the relationship between mental health concerns and addiction.     Data: Client reports mental health concerns of anxiety, depression, trauma and grief and loss.  Client denies current or past mental health diagnoses or psychotropic medications.  EMR indicates past diagnosis of anxiety and historical medication with Lexapro.  On 2/04/2019, client s PHQ-9 score was 3 out of 27, indicating minimal depression, and ZAID-7 score was 4 out of 21, indicating minimal anxiety.  Client's protective factors include:       Having people in his/her life that would prevent the patient from considering a suicide attempt (i.e. young children, spouse, parents, etc.)     Having easy access to supportive family members.     On Wednesday client rates her depression, anxiety, or other mental health concerns as 7 of 10 (10 highest), due to symptoms of \"physical anxiety.  Variety of reasons I imagine\".  Client denies any thoughts of hurting herself or others.  Client states she is feeling \"incredibly\" anxious with heart palpitations " "at times, but also feels increased confidence in herself that she can live independently and remove herself from the constant relationship strain she has been experiencing.     DIMENSION 4:  Readiness to Change  Consider the amount of support and encouragement necessary to keep the client involved in treatment.     Readiness to Change - Current Risk Factor:  1    Goals:  Understand the impact your substance use has had on you, your family, and significant relationships.  Increase internal motivation to change.    Data: On Wednesday client rates her motivation for recovery as 6 of 10 (10 highest).  Client identified 3 reasons she wants to remain sober.     DIMENSION 5:  Relapse/Continued Use/Continued Problem Potential  Consider the degree to which the client recognizes relapse issues and has the skills to prevent relapse of either substance use or mental health problems.     Relapse/Continued Use/Continued Problem Potential - Current Risk Factor:  3    Goals:  Identify and understand personal relapse and self-sabotage patterns.  Identify personal triggers and relapse warning signs.  Develop sober coping and living skills in order to address the development of a strategy for long term recovery.    Data: Client reports approximately 15 detox admissions and 7 prior CD treatment experiences with 6 completed.  She reports a 2-year period of sobriety in the past followed by two 1-year periods.  Client identifies a pattern of having a year of sobriety and relapsing for a month.  She reports she has been unable to attain sobriety on her own.      On Wednesday client rates her strongest craving to use in the past week as 0 of 10 (10 highest).  Client states \"Family group; looked at a couple of houses online and 1 in person\" were some things she did to change her thinking and behaviors for the sake of her sobriety.  Client reports a personal strength of optimistic.  She reports using coping skills of not being afraid to speak " "up, breathing techniques, and positive affirmations.    DIMENSION 6:  Recovery Environment  Consider the degree to which key areas of the client's life are supportive of or antagonistic to treatment participation and recovery.     Recovery Environment - Current Risk Factor:  3    Support group attended this week:  Yes    Did family agree to attend family week:  yes    If yes: scheduled 2/25/19 and 3/4/19.    Goals:  Develop a sober support network.  Increase sober support network. Identify and attend sober support group meetings.  Heal relationship with boyfriend.    Data: Client is unemployed but seeking work.  Client lives with her non-using boyfriend who she describes as supportive.      On Monday client reports attendance at 2 sober support meetings in the past week.  Client reports having found a temporary sponsor.  Client states \"things are up and down\" at home, work, and with outside support.  Client participated in sober activity of \"Family group.  Spent time with my niece and her little boy\".         Intervention:   Behavioral Therapy, Cognitive Behavioral Therapy, Counselor Feedback, Psychoeducation, Emotional management, Group Feedback, Motivational Interviewing, Relapse Prevention, Twelve Step Facilitation, REBT, and DBT    On Monday, client attended Family group.  On Tuesday, client was absent.  On Wednesday client responded to a reading from Lane Cedillo s  You Can t Stop the Waves but You Can Learn to Surf  (Wherever You Go There You Are) and expressed a new appreciation of what meditation can do for her.  Relapse Prevention was also addressed through group peer s presentation of their  Triggers and Cues  assignment with discussion.  Client participated, provided supportive feedback, and spoke about how she related to emotional and physical cues described in peer s  assignment.  Client participated in self-soothing exercises.  Client practiced 4x4 and natural mindful breathing.  Client expressed " "something she was grateful for.  Client affirmed and supported for taking actions to find supportive housing.     Assessment:  Stages of Change Model  Contemplation    Plan:   Attend DA with Mayda Alejandro.  Practice awareness of current state of mind.  Prepare to present second half \"10 [place]\" assignment.  Practice 4x4 breathing 3 times daily before next session.      BUSTER Best, LADC   "

## 2019-03-11 ENCOUNTER — HOSPITAL ENCOUNTER (OUTPATIENT)
Dept: BEHAVIORAL HEALTH | Facility: CLINIC | Age: 29
End: 2019-03-11
Attending: SOCIAL WORKER
Payer: COMMERCIAL

## 2019-03-11 PROCEDURE — H2035 A/D TX PROGRAM, PER HOUR: HCPCS | Mod: HQ

## 2019-03-12 ENCOUNTER — HOSPITAL ENCOUNTER (OUTPATIENT)
Dept: BEHAVIORAL HEALTH | Facility: CLINIC | Age: 29
End: 2019-03-12
Attending: SOCIAL WORKER
Payer: COMMERCIAL

## 2019-03-12 PROCEDURE — H2035 A/D TX PROGRAM, PER HOUR: HCPCS | Mod: HQ
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
## 2019-03-13 ENCOUNTER — HOSPITAL ENCOUNTER (OUTPATIENT)
Dept: BEHAVIORAL HEALTH | Facility: CLINIC | Age: 29
End: 2019-03-13
Attending: SOCIAL WORKER
Payer: COMMERCIAL

## 2019-03-13 PROCEDURE — H2035 A/D TX PROGRAM, PER HOUR: HCPCS | Performed by: SOCIAL WORKER

## 2019-03-13 NOTE — PROGRESS NOTES
Shefali completed a Diagnostic Assessment today, Wed, 3/13/19, form 3:20 PM - 4:55 PM. Please see document for details.

## 2019-03-14 ENCOUNTER — HOSPITAL ENCOUNTER (OUTPATIENT)
Dept: BEHAVIORAL HEALTH | Facility: CLINIC | Age: 29
End: 2019-03-14
Attending: SOCIAL WORKER
Payer: COMMERCIAL

## 2019-03-14 PROCEDURE — H2035 A/D TX PROGRAM, PER HOUR: HCPCS | Mod: HQ

## 2019-03-14 NOTE — PROGRESS NOTES
CD ADULT Progress Note     Underlined portions in a dimension indicate important information carried forward from week to week as a reminder.     Treatment Plan Review completed on:  3/14/2019     Attendance Dates:  3/11/19, 3/12/19, and 3/14/19     Total # of Group Sessions:  Phase I:  18 plus service initiation and treatment planning sessions    Length of stay/projected discharge date: 6/13/2019 MONDAY TUESDAY WEDNESDAY THURSDAY FRIDAY SATURDAY SUNDAY Total   Group Therapy 2 hours  0 hours 3 hours    5 hours   Specialty Groups*  2 hours      2 hours   1:1           Family Program           [place]             Phase III             Absent           Total 2 hours 2 hours 0 hours 3 hours    7 hours     *Specialty Groups include Mental Health Care, Assertiveness and Communication, Sobriety Maintenance Skills, Spiritual Care, Stress Management, Relapse Prevention, Family Systems.                    Learning Style:  by reading    Staff member contributing: KYRA Best     Received supervision:  Yes, ZHANNA Leigh, PO, KYRA     Client: contributed to goals and plan    Did Client receive a copy of treatment plan/revised plan: Yes; signed 2/6/2019    Changes to Treatment Plan: No    Client agrees with plan/revised plan: Yes    Any changes in Vulnerable Adult Status?  No   (If yes, add to treatment plan and individual abuse prevention plan).    Substance Use Disorders:    Opioid Use Disorder, Severe   (F11.20) (304.00)  Sedative, Hypnotic, or Anxiolytic Use Disorder, Severe  (F13.20) (304.10)  Stimulant Related Disorder, Severe   (F15.20) (304.40) Amphetamine type substance  Tobacco Use Disorder, Moderate   (F17.200) (305.1)    1) Care Coordination Activities:  conferred with ZHANNA Ely, PO  2) Medical, Mental Health and other appointments the client attended: appt 3/13/19 with Mayda Senior  3) Medication issues: suboxone maintenance 20 mg/daily  4) Physical and mental health problems: See  dimensions 2 and 3 below for further details.  5) Review and evaluation of the individual abuse prevention plan: CHICA    ASAM Risk Ratings and Data       DIMENSION 1: Acute Intoxication/Withdrawal  The client's ability to cope with withdrawal symptoms and current state of intoxication       Acute Intoxication/Withdrawal - Current Risk Factor:  1    Reporting sober date of 10/25/2018    Goals:  Develop effective strategies to maintain sobriety.  Continue Suboxone maintenance.  Report to counselor and group any alcohol or substance use.     Data: Client reports last use of heroin, methamphetamine and benzodiazapine as 10/24/2018.  She is on Suboxone maintenance of 20 mg daily.     Client exhibits no signs of intoxication or withdrawal throughout the week.    DIMENSION 2:  Biomedical Conditions and Complaints  The degree to which any physical disorder would interfere with treatment for substance abuse and the client's ability to tolerate any related discomfort     Biomedical Conditions and Complaints - Current Risk Factor:  0    Goals:  Obtain a neurological examination.  Disclose CD status to medical providers and follow up with medical interventions while in IOP.  Maintain good physical health.    Data: Client denies chronic biomedical conditions which would interfere with treatment.  She reports medical conditions of hypothyroidism, acid reflux, allergy to bees, and periodic limited and minor seizure episodes without loss of consciousness.  Client reports having a PCP and sees an addiction specialist monthly.  She reports medication compliance with Levothyroxine, Famotidine, and Suboxone. She takes 2.5 8mg strips of Suboxone per day and has since 2013.     On Monday client denies any new or worsening medical conditions.  Client reports meeting her exercise goal this week.    DIMENSION 3:  Emotional/Behavioral/Cognitive Conditions and Complications  The degree to which any condition or complications are likely to  "interfere with treatment for substance abuse or with function in significant life areas and the likelihood of risk of harm to self or others.     Emotional/Behavioral - Current Risk Factor:  2    DSM-5 Diagnoses:   Client reports feelings of anxiety, depression, trauma and grief and loss.  Therapist will do a diagnostic interview to update information       Suicide Assessment: very low risk  Risk Status    Ideation - Active thoughts of suicide Intent to follow through on suicide Plan for completing suicide    Yes No Yes No Yes No   Emergent  x  x  x   Urgent / Non-Emergent  x  x  x   Non- Urgent  x  x  x   No Current/Active Risk  x  x  x          Goals: Learn how to apply self-care and psychotherapy to build a repertoire of daily habits that counteract PAWS, fatigue, depression and anxiety leading to increased resiliency and well-being.  Schedule a mental health diagnostic assessment.  Understand the relationship between mental health concerns and addiction.     Data: Client reports mental health concerns of anxiety, depression, trauma and grief and loss.  Client denies current or past mental health diagnoses or psychotropic medications.  EMR indicates past diagnosis of anxiety and historical medication with Lexapro.  On 2/04/2019, client s PHQ-9 score was 3 out of 27, indicating minimal depression, and ZAID-7 score was 4 out of 21, indicating minimal anxiety.  Client's protective factors include:       Having people in his/her life that would prevent the patient from considering a suicide attempt (i.e. young children, spouse, parents, etc.)     Having easy access to supportive family members.     On Monday client rates her depression, anxiety, or other mental health concerns as 5 of 10 (10 highest), due to \"same reasons as always, but it did get better this week.  ORP has been stressful\".  Client denies any thoughts of hurting herself or others.  Client states she is feeling frustrated and anxious due to legal " "issues and requirements of ORP.    DIMENSION 4:  Readiness to Change  Consider the amount of support and encouragement necessary to keep the client involved in treatment.     Readiness to Change - Current Risk Factor:  1    Goals:  Understand the impact your substance use has had on you, your family, and significant relationships.  Increase internal motivation to change.    Data: On Monday client rates her motivation for recovery as 10 of 10 (10 highest).  Client identified 3 reasons she wants to remain sober.     DIMENSION 5:  Relapse/Continued Use/Continued Problem Potential  Consider the degree to which the client recognizes relapse issues and has the skills to prevent relapse of either substance use or mental health problems.     Relapse/Continued Use/Continued Problem Potential - Current Risk Factor:  3    Goals:  Identify and understand personal relapse and self-sabotage patterns.  Identify personal triggers and relapse warning signs.  Develop sober coping and living skills in order to address the development of a strategy for long term recovery.    Data: Client reports approximately 15 detox admissions and 7 prior CD treatment experiences with 6 completed.  She reports a 2-year period of sobriety in the past followed by two 1-year periods.  Client identifies a pattern of having a year of sobriety and relapsing for a month.  She reports she has been unable to attain sobriety on her own.      On Monday client rates her strongest craving to use in the past week as 0 of 10 (10 highest).  Client states \"meditate on God.  Spent time with a friend in recovery\" were some things she did to change her thinking and behaviors for the sake of her sobriety.  Client reports a personal strength of kinga.  She reports using a coping skill of taking a step back.    DIMENSION 6:  Recovery Environment  Consider the degree to which key areas of the client's life are supportive of or antagonistic to treatment participation and " "recovery.     Recovery Environment - Current Risk Factor:  3    Support group attended this week:  Yes    Did family agree to attend family week:  yes    If yes: scheduled 2/25/19 and 3/4/19.    Goals:  Develop a sober support network.  Increase sober support network. Identify and attend sober support group meetings.  Heal relationship with boyfriend.    Data: Client is unemployed but seeking work.  Client lives with her non-using boyfriend who she describes as supportive.      On Monday client reports attendance at 2 sober support meetings in the past week.  Client reports having found a temporary sponsor.  Client states things are \"goodish.  Less anxious, still keeping my options open which is calming\" at home, work, and with outside support.  Client participated in sober activity of \"hanging at the mall and arcade with a friend\".         Intervention:   Behavioral Therapy, Cognitive Behavioral Therapy, Counselor Feedback, Psychoeducation, Emotional management, Group Feedback, Motivational Interviewing, Relapse Prevention, Twelve Step Facilitation, REBT, and DBT    On Monday, client participated with the check- in process and affirmation.  Readiness to Change, Relapse Prevention, and Recovery Planning were addressed through group peer presentation of their  10 Worst Consequences  assignment with discussion.  Client participated, provided supportive feedback, and spoke about how she related to behaviors, thoughts, and feelings described in peer s assignment.  On Tuesday, Relapse Prevention and Recovery Planning were addressed.  Client participated in viewing \"Happiness\" video dealing with Positive Psychology and engaging in healthy behaviors.  Client identified one skill presented which she thought would be most useful.  Client participated in self-soothing exercises.  Client practiced 4x4 and natural mindful breathing.  Client expressed something she was grateful for.  Client affirmed and supported for being a " "valuable member of group.  On Wednesday, client was absent.     Assessment:  Stages of Change Model  Contemplation    Plan:   Attend psychotherapy with Mayda Alejandro.  Practice awareness of current state of mind.  Prepare to present second half \"10 Worst Consequences\" and other assignments.  Practice 4x4 breathing 3 times daily before next session.      BUSTER Best, LADC   "

## 2019-03-15 NOTE — PROGRESS NOTES
"Jennifer Stanford  March 14, 2019  [id number]    [facility] Mental Health Process Group Note      Day and Date and Time of Service:  Thursday, 3/14/19      Number of participants:  4      Length of Group:  3 hours      Goal of the Group: Learn aspects of self-care to increase self-empowerment and reduce the impact of post-acute withdrawal symptoms. Learn how self-care can lead to clearer thinking and increased physical and mental resiliency.      [place]: Group Topics and Activities      I.  D3 Intervention and Group Topic addressed: Self-Care and Self-Compassion; Self-Care quiz and goals for next week     II. Mindfulness and/or Motivational Component: Self-Care Evaluation, Worksheet to prioritize needs, Laughter as Self-Care     III. Additional Activity: Completed a self-care evaluation and then prioritized what they felt was lacking and identified what aspect of self-care to address first. Then, developed steps to start this week to improve this area and be more proactive in their own healthcare routine.    The group also learned how Laughter is beneficial to our health.                  IV. Review of Progress:   A. Client s self-report:  Shefali reported having a good ORP meeting and said they are very supportive of her recovery. She is preparing to start volunteering with [employer] and she told the group the history of this service and she is very excited to start this.  She continues to do her journal and mindfulness daily and her stress was a 4 this week and she is practicing being present which helps.  She sometimes thinks about Xanax when she is stressed but did not consider this a craving and has not used any.  She reported no cravings.      B. Therapist s Assessment:   Shefali is very open to the group process and participates fully and uses humor in appropriate ways.   The Self-Care area to address first:    Nutrition   The Step to work on this week:   \"Focus on the Healthy Plate and get the proper variety of foods, " "and increase my water intake.\"            V.   Reflection and evaluation of today s group experience:  Gave verbal feedback and filled out evaluation form. The most important things learned today were \"I enjoyed learning about how much what we do on a daily basis affects our state of mind. Even the most trivial things.  Sleep, water, food choices, laughter etc.\" and she described a concern of hers as \"Being more mindful of my lack of concentrated effort to address crucial areas.  It's easy to say, \"Oh, I'll do it later.\" So, implementation\" and she was surprised that \"The food pyramid is now a plate.\"       VI. Assignments or activities to do for next week: Continue to complete the daily journal before bed, do at least one mindfulness exercise a day, practice cognitive defusion for negative thoughts and emotions.   Follow the steps s/he created regarding the self-care identified as most urgent to address.       Therapeutic techniques in this session:  Motivational Therapy, Supportive, Behavioral Modification and Mindfulness      Additional Treatment Referrals/Follow-up Issues to Address: Follow-up is not indicated at this time.       Change Treatment Plan:  No, however, this group does fulfill one intervention under D3.       Change Diagnosis: No changes this week.      "

## 2019-03-19 ENCOUNTER — HOSPITAL ENCOUNTER (OUTPATIENT)
Dept: BEHAVIORAL HEALTH | Facility: CLINIC | Age: 29
End: 2019-03-19
Attending: SOCIAL WORKER
Payer: COMMERCIAL

## 2019-03-19 PROCEDURE — H2035 A/D TX PROGRAM, PER HOUR: HCPCS | Mod: HQ

## 2019-03-20 ENCOUNTER — HOSPITAL ENCOUNTER (OUTPATIENT)
Dept: BEHAVIORAL HEALTH | Facility: CLINIC | Age: 29
End: 2019-03-20
Attending: SOCIAL WORKER
Payer: COMMERCIAL

## 2019-03-20 PROCEDURE — H2035 A/D TX PROGRAM, PER HOUR: HCPCS | Mod: HQ

## 2019-03-21 ENCOUNTER — HOSPITAL ENCOUNTER (OUTPATIENT)
Dept: BEHAVIORAL HEALTH | Facility: CLINIC | Age: 29
End: 2019-03-21
Attending: SOCIAL WORKER
Payer: COMMERCIAL

## 2019-03-21 PROCEDURE — H2035 A/D TX PROGRAM, PER HOUR: HCPCS | Mod: HQ | Performed by: SOCIAL WORKER

## 2019-03-21 NOTE — PROGRESS NOTES
CD ADULT Progress Note     Underlined portions in a dimension indicate important information carried forward from week to week as a reminder.     Treatment Plan Review completed on:  3/21/2019     Attendance Dates:  3/19/19, 3/20/19, and 3/21/19     Total # of Group Sessions:  Phase I:  18 plus service initiation and treatment planning sessions    Length of stay/projected discharge date: 6/13/2019 MONDAY TUESDAY WEDNESDAY THURSDAY FRIDAY SATURDAY SUNDAY Total   Group Therapy   2 hours 3 hours    5 hours   Specialty Groups*  2 hours      2 hours   1:1           Family Program           [place]             Phase III             Absent           Total  2 hours 2 hours 3 hours    7 hours     *Specialty Groups include Mental Health Care, Assertiveness and Communication, Sobriety Maintenance Skills, Spiritual Care, Stress Management, Relapse Prevention, Family Systems.                    Learning Style:  by reading    Staff member contributing: ASHLEE Multani, Kyra    Received supervision:  Yes, ZHANNA Leigh, PO, KYRA     Client: contributed to goals and plan    Did Client receive a copy of treatment plan/revised plan: Yes; signed 2/6/2019    Changes to Treatment Plan: No    Client agrees with plan/revised plan: Yes    Any changes in Vulnerable Adult Status?  No   (If yes, add to treatment plan and individual abuse prevention plan).    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  5) Review and evaluation of the individual abuse prevention plan: CHICA    ASAFLO Risk Ratings and Data       DIMENSION 1: Acute Intoxication/Withdrawal  The client's ability to cope with withdrawal symptoms and current state of intoxication       Acute Intoxication/Withdrawal - Current Risk Factor:  1    Reporting sober date of 10/25/2018    Goals:  Develop effective strategies to maintain sobriety.  Continue Suboxone maintenance.  Report to counselor and group any alcohol or substance use.     Data: Client reports last use of heroin, methamphetamine and benzodiazapine as 10/24/2018.  She is on Suboxone maintenance of 20 mg daily.     Client exhibits no signs of intoxication or withdrawal throughout the week.    DIMENSION 2:  Biomedical Conditions and Complaints  The degree to which any physical disorder would interfere with treatment for substance abuse and the client's ability to tolerate any related discomfort     Biomedical Conditions and Complaints - Current Risk Factor:  0    Goals:  Obtain a neurological examination.  Disclose CD status to medical providers and follow up with medical interventions while in IOP.  Maintain good physical health.    Data: Client denies chronic biomedical conditions which would interfere with treatment.  She reports medical conditions of hypothyroidism, acid reflux, allergy to bees, and periodic limited and minor seizure episodes without loss of consciousness.  Client reports having a PCP and sees an addiction specialist monthly.  She reports medication compliance with Levothyroxine, Famotidine, and Suboxone. She takes 2.5 8mg strips of Suboxone per day and has since 2013.     On Tuesday client denies any new or worsening medical conditions. Client reported nothing new with health, other than having a migraine on Sunday and Monday     DIMENSION 3:  Emotional/Behavioral/Cognitive Conditions and Complications  The degree to which any condition or  "complications are likely to interfere with treatment for substance abuse or with function in significant life areas and the likelihood of risk of harm to self or others.     Emotional/Behavioral - Current Risk Factor:  2    DSM-5 Diagnoses:   Client reports feelings of anxiety, depression, trauma and grief and loss.  Therapist will do a diagnostic interview to update information       Suicide Assessment: very low risk  Risk Status    Ideation - Active thoughts of suicide Intent to follow through on suicide Plan for completing suicide    Yes No Yes No Yes No   Emergent  x  x  x   Urgent / Non-Emergent  x  x  x   Non- Urgent  x  x  x   No Current/Active Risk  x  x  x          Goals: Learn how to apply self-care and psychotherapy to build a repertoire of daily habits that counteract PAWS, fatigue, depression and anxiety leading to increased resiliency and well-being.  Schedule a mental health diagnostic assessment.  Understand the relationship between mental health concerns and addiction.     Data: Client reports mental health concerns of anxiety, depression, trauma and grief and loss.  Client denies current or past mental health diagnoses or psychotropic medications.  EMR indicates past diagnosis of anxiety and historical medication with Lexapro.  On 2/04/2019, client s PHQ-9 score was 3 out of 27, indicating minimal depression, and ZAID-7 score was 4 out of 21, indicating minimal anxiety.  Client's protective factors include:       Having people in his/her life that would prevent the patient from considering a suicide attempt (i.e. young children, spouse, parents, etc.)     Having easy access to supportive family members.     On Tuesday client rates her depression, anxiety, or other mental health concerns as 4 of 10 (10 highest), due to \"same reasons as always, but that she feels she is addressing them accordinginly. These stressors include her relationship and living situation. Client denies any thoughts of hurting " "herself or others.      DIMENSION 4:  Readiness to Change  Consider the amount of support and encouragement necessary to keep the client involved in treatment.     Readiness to Change - Current Risk Factor:  1    Goals:  Understand the impact your substance use has had on you, your family, and significant relationships.  Increase internal motivation to change.    Data: On Tuesday client rates her motivation for recovery as 10 of 10 (10 highest).  Client identified 3 reasons she wants to remain sober.     DIMENSION 5:  Relapse/Continued Use/Continued Problem Potential  Consider the degree to which the client recognizes relapse issues and has the skills to prevent relapse of either substance use or mental health problems.     Relapse/Continued Use/Continued Problem Potential - Current Risk Factor:  3    Goals:  Identify and understand personal relapse and self-sabotage patterns.  Identify personal triggers and relapse warning signs.  Develop sober coping and living skills in order to address the development of a strategy for long term recovery.    Data: Client reports approximately 15 detox admissions and 7 prior CD treatment experiences with 6 completed.  She reports a 2-year period of sobriety in the past followed by two 1-year periods.  Client identifies a pattern of having a year of sobriety and relapsing for a month.  She reports she has been unable to attain sobriety on her own.      On Tuesday client rates her strongest craving to use in the past week as 2 of 10 (10 highest). Client stated benzo's are \"fluttering in the back of my mind when I am super anxious.\"  Client states meditation has been a lifesaver for her this past week.  Client stated she met with her sponsor, talked with a good friends, and has been goal setting for the future.     DIMENSION 6:  Recovery Environment  Consider the degree to which key areas of the client's life are supportive of or antagonistic to treatment participation and recovery. " "    Recovery Environment - Current Risk Factor:  3    Support group attended this week:  Yes    Did family agree to attend family week:  yes    If yes: scheduled 2/25/19 and 3/4/19.    Goals:  Develop a sober support network.  Increase sober support network. Identify and attend sober support group meetings.  Heal relationship with boyfriend.    Data: Client is unemployed but seeking work.  Client lives with her non-using boyfriend who she describes as supportive.      On Tuesday client reports attendance at 2 sober support meetings in the past week.  Client reports having found a temporary sponsor.  Client states things at home are tense, but groups and sponsorship have been great.  Client participated in sober activity of spending time with family and talking to sober friends.          Intervention:   Behavioral Therapy, Cognitive Behavioral Therapy, Counselor Feedback, Psychoeducation, Emotional management, Group Feedback, Motivational Interviewing, Relapse Prevention, Twelve Step Facilitation, REBT, and DBT    On Monday client was absent. On Tuesday, client participated in a lecture on the 5 love languages. Client did well identifying her love language and sharing how this fits into her life. Client also participated in a group game that tied in mindfulness. On Wednesday, Relapse Prevention was addressed through client presenting her assignment. She did well on this assignment and did well accepting feedback from the group. Client expressed a lot of guilt and shame for her past use experiences. Client participated in a group game that tied into team building and creative thinking.  Client expressed something she was grateful for.  Client affirmed and supported for active engagement in group this week.     Assessment:  Stages of Change Model  Contemplation    Plan:   Attend psychotherapy with Mayda Alejandro.  Practice awareness of current state of mind.  Prepare to present second half \"10 Worst Consequences\" and " other assignments.  Practice 4x4 breathing 3 times daily before next session.    Cristopher Pritchard, [facility]

## 2019-03-21 NOTE — PROGRESS NOTES
"Jennifer, \"Shefali,\" did not attend her individual session today. She stopped in at 4:45 to explain that she was called in for a random drug test and also left a message on my voicemail. This was an excused absence.    "

## 2019-03-22 NOTE — PROGRESS NOTES
"Jennifer Stanford  March 22, 2019  [id number]    [facility] Mental Health Process Group Note      Day and Date: Thursday, 3/21/19         Number of participants: 8      Length of Group: 3 hours       Goal of the Group: Learn the importance of Stress Management and techniques to reduce PAWS and stress-related symptoms, increase resiliency, and learn healthy routines to replace dysfunctional habits       Wing: Group Topics and Activities      I.  D3 Intervention and Group Topic addressed: Stress management, productivity, balance and procrastination     II. Mindfulness and/or Motivational Component: Group completed progressive relaxation and deep breathing techniques and identified skills to work on this week. Group also did the mindfulness exercise Awareness of Sounds       III. Additional Activity: Clients identified personal stressors and if/how addiction intensified their stress. They completed the [place] exercise that involves the mindfulness work.  The group also learned tips to overcome procrastination and the mental health benefits of  de-cluttering  their environment.        IV. Review of Progress:   A. Client s self-report: Shefali reported that she will touring a sober house tomorrow and she plans to move out on her own due to issues with her SO.  Her stress screening was high. She is doing her journal and using a \"gratitude venu.\" She does mindfulness by \"readjusting my mind state\" and becoming centered in the present. Her stress is a 5 when she feels \"physically anxious\" and she still has not called her MD about heart palpitations which she said she would do tomorrow. She is accomplishing a lot and making changes conducive to her sobriety.        B. Therapist s assessment:  Shefali missed her individual session today but did stop in before group and we discussed her DA and she agreed with the diagnosis of ZAID and PTSD and I explained what \"cluster B traits\" and that I did not diagnose her with any personality " "disorders, but want to rule out, and we will re-assess as she continues to go through PAWS.  Shefali participates fully in group and is open to the process.      V.   Reflection and evaluation of today s group experience:  Gave verbal feedback and filled out evaluation form. Client wrote the most important thing learned today was \"Procrastination is the devil. But really it was eye-opening to do the stress survey, that really impacted me\" and \"I need to change my environment.\"      VI. Assignments or activities to do for next week: Continue to do journal before bed, do at least one mindfulness exercise in the day, address one self-care area a week, incorporate one stress reduction technique into daily schedule of structure and routine for this week.      Therapeutic techniques in this session:  Motivational Therapy, CBT/DBT/ACT, Supportive, Behavioral Modification and Mindfulness      Additional Treatment Referrals/Follow-up Issues to Address: Not needed at this time.      Change in Treatment Plan: No change, this group completes one intervention in Treatment Plan under Dimension 3.        Change in Diagnosis: No change in diagnosis indicated.   "

## 2019-03-22 NOTE — PROGRESS NOTES
Jennifer Stanford  March 21, 2019  [id number]    [facility] Mental Health Process Group Note      Day and Date: Thursday, 3/21/19         Number of participants: 8      Length of Group: 3 hours       Goal of the Group: Learn the importance of Stress Management and techniques to reduce PAWS and stress-related symptoms, increase resiliency, and learn healthy routines to replace dysfunctional habits       [place]: Group Topics and Activities      I.  D3 Intervention and Group Topic addressed: Stress management, productivity, balance and procrastination     II. Mindfulness and/or Motivational Component: Group completed progressive relaxation and deep breathing techniques and identified skills to work on this week. Group also did the mindfulness exercise Awareness of Sounds       III. Additional Activity: Clients identified personal stressors and if/how addiction intensified their stress. They completed the [place] exercise that involves the mindfulness work.  The group also learned tips to overcome procrastination and the mental health benefits of  de-cluttering  their environment.        IV. Review of Progress:   A. Client s self-report:        B. Therapist s assessment:           V.   Reflection and evaluation of today s group experience:  Gave verbal feedback and filled out evaluation form. Client wrote the most important thing learned today was      VI. Assignments or activities to do for next week: Continue to do journal before bed, do at least one mindfulness exercise in the day, address one self-care area a week, incorporate one stress reduction technique into daily schedule of structure and routine for this week.      Therapeutic techniques in this session:  Motivational Therapy, CBT/DBT/ACT, Supportive, Behavioral Modification and Mindfulness      Additional Treatment Referrals/Follow-up Issues to Address: Not needed at this time.      Change in Treatment Plan: No change, this group completes one intervention in  Treatment Plan under Dimension 3.        Change in Diagnosis: No change in diagnosis indicated.

## 2019-03-26 ENCOUNTER — HOSPITAL ENCOUNTER (OUTPATIENT)
Dept: BEHAVIORAL HEALTH | Facility: CLINIC | Age: 29
End: 2019-03-26
Attending: SOCIAL WORKER
Payer: COMMERCIAL

## 2019-03-26 PROCEDURE — H2035 A/D TX PROGRAM, PER HOUR: HCPCS | Mod: HQ

## 2019-03-27 ENCOUNTER — HOSPITAL ENCOUNTER (OUTPATIENT)
Dept: BEHAVIORAL HEALTH | Facility: CLINIC | Age: 29
End: 2019-03-27
Attending: SOCIAL WORKER
Payer: COMMERCIAL

## 2019-03-27 PROCEDURE — H2035 A/D TX PROGRAM, PER HOUR: HCPCS | Mod: HQ

## 2019-04-02 ENCOUNTER — HOSPITAL ENCOUNTER (OUTPATIENT)
Dept: BEHAVIORAL HEALTH | Facility: CLINIC | Age: 29
End: 2019-04-02
Attending: SOCIAL WORKER
Payer: COMMERCIAL

## 2019-04-02 PROCEDURE — H2035 A/D TX PROGRAM, PER HOUR: HCPCS | Mod: HQ

## 2019-04-02 ASSESSMENT — ANXIETY QUESTIONNAIRES
GAD7 TOTAL SCORE: 2
7. FEELING AFRAID AS IF SOMETHING AWFUL MIGHT HAPPEN: NOT AT ALL
6. BECOMING EASILY ANNOYED OR IRRITABLE: NOT AT ALL
3. WORRYING TOO MUCH ABOUT DIFFERENT THINGS: SEVERAL DAYS
2. NOT BEING ABLE TO STOP OR CONTROL WORRYING: NOT AT ALL
5. BEING SO RESTLESS THAT IT IS HARD TO SIT STILL: NOT AT ALL
1. FEELING NERVOUS, ANXIOUS, OR ON EDGE: SEVERAL DAYS

## 2019-04-02 ASSESSMENT — PATIENT HEALTH QUESTIONNAIRE - PHQ9
SUM OF ALL RESPONSES TO PHQ QUESTIONS 1-9: 3
5. POOR APPETITE OR OVEREATING: NOT AT ALL

## 2019-04-03 ENCOUNTER — HOSPITAL ENCOUNTER (OUTPATIENT)
Dept: BEHAVIORAL HEALTH | Facility: CLINIC | Age: 29
End: 2019-04-03
Attending: SOCIAL WORKER
Payer: COMMERCIAL

## 2019-04-03 PROCEDURE — H2035 A/D TX PROGRAM, PER HOUR: HCPCS | Mod: HQ

## 2019-04-03 ASSESSMENT — ANXIETY QUESTIONNAIRES: GAD7 TOTAL SCORE: 2

## 2019-04-04 ENCOUNTER — HOSPITAL ENCOUNTER (OUTPATIENT)
Dept: BEHAVIORAL HEALTH | Facility: CLINIC | Age: 29
End: 2019-04-04
Attending: SOCIAL WORKER
Payer: COMMERCIAL

## 2019-04-05 NOTE — PROGRESS NOTES
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) (304.10)  Stimulant Related Disorder, Severe   (F15.20) (304.40) Amphetamine type substance  Tobacco Use Disorder, Moderate   (F17.200) (305.1)    LAST USE DATE: 3/25/2019    Treatment Plan Review completed on:  2019    Attendance Dates: 19, 19 and 4/3/19     Total # of P1 Group Sessions: 24 plus service initiation and treatment planning sessions  Total # of P2 Group Sessions: 2  Total # of P3 Group Sessions: 0  Total # of 1:1 Sessions: 4    Length of stay/projected discharge date: 2019 Total   Group Therapy 2 hours  2 hours     4 hours   Specialty Groups*  2 hours      2 hours   1:1           Family Program           [place]             Phase III             Absent           Total 2 hours 2 hours 2 hours     6 hours     *Specialty Groups include Mental Health Care, Assertiveness and Communication, Sobriety Maintenance Skills, Spiritual Care, Stress Management, Relapse Prevention, Family Systems.                    Learning Style(s):  By reading    Staff member contributing:KYRA Best and KYRA Hinson     Received supervision: Yes, ZHANNA Leigh, PO, KYRA    Client contributed to goals and plan: Yes    Client received a signed copy of treatment plan/revised plan: :Yes    Changes to Treatment Plan: No    Client agrees with plan/revised plan: Yes    Any changes in Vulnerable Adult Status: No    1) Care Coordination Activities: discussed housing situation  2) Medical, Mental Health and other  appointments the client attended: Met with MH 19  3) Medication issues: suboxone maintenance 20 mg/daily  4) Physical and mental health problems:See dimensions 2 and 3 below for further details.  5) Review and evaluation of the individual abuse prevention plan: NA  Dimension One: Acute Intoxication/Withdrawal Potential     Risk at admission to IOP: 1. Risk at transition to Phase 2: Risk Ratin.     Treatment plan goal:   Develop effective strategies to maintain sobriety.  Continue Suboxone maintenance.  Report to counselor and group any alcohol or substance use. CONTINUE     Current ASAM criteria: Client is on suboxone maintenance.     Additional comments: Client exhibits no signs of intoxication or withdrawal throughout the week.    Dimension Two: Biomedical Conditions and Complaints     Risk at admission to IOP: 0. Risk at transition to Phase 2: Risk Ratin.     Treatment plan goal:   Obtain a neurological examination.   CONTINUE  Disclose CD status to medical providers and follow up with medical interventions while in IOP.  CONTINUE   Maintain good physical health.   CONTINUE     Current ASAM criteria: stable and manageable.     Additional comments: On Monday client denies any new or worsening medical conditions.   Dimension Three: Emotional/Behavioral/Cognitive Conditions and Complications     Risk at admission to IOP: 2. Risk at transition to Phase 2: Risk Ratin.     Treatment plan goal:   Learn how to apply self-care and psychotherapy to build a repertoire of daily habits that counteract PAWS, fatigue, depression and anxiety leading to increased resiliency and well-being.  CONTINUE.  Schedule a mental health diagnostic assessment.  COMPLETE.   Understand the relationship between mental health concerns and addiction. CONTINUE.     Current ASAM criteria: stable, manageable and mild to moderate severity     Additional comments: On  client rates her depression, anxiety, or other mental health  "concerns as 4 of 10 (10 highest), due to \"for a lot of mixed feelings considering everything that is happening\".    Client denies any thoughts of hurting herself or others. Client states she is feeling exhausted and grateful at this time.    Dimension Four: Readiness to Change     Risk at admission to IOP: 1. Risk at transition to Phase 2: Risk Ratin.     Treatment plan goal:   Understand the impact your substance use has had on you, your family, and significant relationships. CONTINUE    Increase internal motivation to change. CONTINUE.       Current ASAM criteria: cooperative, motivated and engaged in treatment.     Additional comments: On Monday client rates her motivation for recovery as 8 of 10 (10 highest).  Client identified 3 reasons she wants to remain sober.      Dimension Five: Relapse/Continues Use/Continues Problem Potential     Risk at admission to IOP: 3. Risk at transition to Phase 2: Risk Rating: 3.     Treatment plan goal:   Identify and understand personal relapse and self-sabotage patterns. CONTINUE.   Identify personal triggers and relapse warning signs. CONTINUE.  Develop sober coping and living skills in order to address the development of a strategy for long term recovery. CONTINUE       Current ASAM criteria: client appears to be developing coping skills and moderate to high vulnerability for further substance use problems     Additional comments:  On Monday client rates her strongest craving to use in the past week as 1 of 10 (10 highest). Client states \"when anxiety gets overwhelming or consuming my mind always flashes over to benzo's. Then the tape plays through\" why she was at this level. Client shared \" made steps to move. Which for me is a big deal. Included the people I need to in on how things are feeling as I take steps...Kelsey, you etc.\" as something she did to change her thinking and behaviors for the sake of her sobriety.  Client reports a personal strength of determination. " " She reports using coping skills of attending meetings and reached out when feeling anxious  And continued morning and evening meditation      Dimension Six: Recovery Environment     Risk at admission to [facility]: 3. Risk at transition to Phase 2: Risk Ratin.     Treatment plan goal:   Identify and develop a sober support network.CONTINUE Identify and attend sober support group meetings. CONTINUE Heal relationship with boyfriend.CONTINUE.       Current ASAM criteria: support support level of new environment undetermined. engaged in structured and meaningful activity and client appears to be developing coping skills     Additional comments: On Monday, client reports things at home, work and with outside support groups are \" in transition. So they are going well but its all happening so quickly I don't know how to feel. Other than grateful\". Client reports attendance at 3 sober support meetings in the past week.  Client reports sober activities of fellowship with people from her groups Wednesday and .   Data: On Wednesday, Client shared in group her frustration with a male friend that declared his love for her.  Intervention: On Wednesday, counselor and peers shared empathy with client about frustration.  Client received feedback and support from peers and was encourage to communicate with her feelings and to set appropriate boundaries.  Assessment: Client does appear to be overwhelmed emotionally with her situation and now her friend as declared an ultimatum that they cannot be friends because of his feelings for her.  Plan: Client is to set appropriate boundaries with friend and communicate clearly with her friend.       PLAN: Transition client to Phase 2 effective 19 due to progress toward goals in treatment plan.  Client currently meets the ASAM criteria for; Outpatient (ASAM level 1)  level of care.    KYRA Hinson  "

## 2019-04-05 NOTE — PROGRESS NOTES
Progress Note for Individual Session 4/04/19:  4:00 PM - 4:23 PM  (No Billing)    D: Shefali emailed me to say she would be late for our individual session by 30 minutes or so and should she still come. I emailed back saying that we could reschedule as 30 minutes or less is not enough time unless she feels that there is an urgent matter to talk to me about. She did not read my response so came at 4:00 and told me she was doing well but came so she could  just check in and make another appt.  She is doing well, planning on moving to a new home away from her SO and starting a new life. She said  I was trying to fix the life I had but it s not about that, it s about the life I m going to have.  She said this move makes her feel more empowered but she is concerned about living on her own. We went over a plan for when she feels lonely or thinks of using and she was able to give me 3 names of people who are supportive of her she can call anytime, including her sponsor. She said  I used to say  I get along better with guys  but now I think I need to find strong women and learn from them.  She wants to find a women s support group also.      I: A brief check-in session, supportive and motivational therapy.     A: Shefali is doing well and making changes conducive to recovery.  She is beginning to feel unburdened by some of her decisions and she is learning to care for herself and depend on herself more.     P: Shefali is in process of moving out of her SO s home. She attends sober support regularly and will start reading the book, Adult Children of Alcoholics, to give her some insight on her emotional and labile moods. Since our time was short, she rescheduled this session for next Thurs, 4/11/19.

## 2019-04-08 ENCOUNTER — HOSPITAL ENCOUNTER (OUTPATIENT)
Dept: BEHAVIORAL HEALTH | Facility: CLINIC | Age: 29
End: 2019-04-08
Attending: SOCIAL WORKER
Payer: COMMERCIAL

## 2019-04-08 PROCEDURE — H2035 A/D TX PROGRAM, PER HOUR: HCPCS | Mod: HQ

## 2019-04-09 ENCOUNTER — HOSPITAL ENCOUNTER (OUTPATIENT)
Dept: BEHAVIORAL HEALTH | Facility: CLINIC | Age: 29
End: 2019-04-09
Attending: SOCIAL WORKER
Payer: COMMERCIAL

## 2019-04-09 PROCEDURE — H2035 A/D TX PROGRAM, PER HOUR: HCPCS | Mod: HQ

## 2019-04-12 NOTE — ADDENDUM NOTE
Encounter addended by: Edson Lopez, [facility] on: 4/12/2019 2:28 PM   Actions taken: Sign clinical note

## 2019-04-12 NOTE — PROGRESS NOTES
CD ADULT Progress Note     Underlined portions in a dimension indicate important information carried forward from week to week as a reminder.     Treatment Plan Review completed on:  4/12/2019     Attendance Dates:  4/8/19 and 4/9/19     Total # of Group Sessions:  Phase I:  24 plus service initiation and treatment planning sessions    Phase 2:   3    Length of stay/projected discharge date: 6/13/2019 MONDAY TUESDAY WEDNESDAY THURSDAY FRIDAY SATURDAY SUNDAY Total   Group Therapy 2 hours  0 hours 0 hours    2 hours   Specialty Groups*  2 hours      2 hours   1:1           Family Program           [place]             Phase III             Absent   weather        Total 2 hours 2 hours 0 hours 0 hours    4 hours     *Specialty Groups include Mental Health Care, Assertiveness and Communication, Sobriety Maintenance Skills, Spiritual Care, Stress Management, Relapse Prevention, Family Systems.                    Learning Style:  by reading    Staff member contributing: BUSTER Best, [facility]     Received supervision:  Staffed with PO Barnett      Client: contributed to goals and plan    Did Client receive a copy of treatment plan/revised plan: Yes; signed 2/6/2019    Changes to Treatment Plan: No    Client agrees with plan/revised plan: Yes    Any changes in Vulnerable Adult Status?  No   (If yes, add to treatment plan and individual abuse prevention plan).    Substance Use Disorders:    Opioid Use Disorder, Severe   (F11.20) (304.00)  Sedative, Hypnotic, or Anxiolytic Use Disorder, Severe  (F13.20) (304.10)  Stimulant Related Disorder, Severe   (F15.20) (304.40) Amphetamine type substance  Tobacco Use Disorder, Moderate   (F17.200) (305.1)    1) Care Coordination Activities:  conferred with ZHANNA Ely, PO  2) Medical, Mental Health and other appointments the client attended: biomedical scheduled 4/11/19  3) Medication issues: suboxone maintenance 20 mg/daily  4) Physical and mental health problems:  See dimensions 2 and 3 below for further details.  5) Review and evaluation of the individual abuse prevention plan: CHICA    ASAM Risk Ratings and Data       DIMENSION 1: Acute Intoxication/Withdrawal  The client's ability to cope with withdrawal symptoms and current state of intoxication       Acute Intoxication/Withdrawal - Current Risk Factor:  1    Reporting sober date of , 3/26/2019    Goals:  Develop effective strategies to maintain sobriety.  Continue Suboxone maintenance.  Report to counselor and group any alcohol or substance use.     Data: Client reports last use of heroin, methamphetamine and benzodiazapine as 10/24/2018.  She is on Suboxone maintenance of 20 mg daily.    3/26/19:  Client reports last use of alcohol as 3/25/2019.    Client exhibits no signs of intoxication or withdrawal throughout the week.      DIMENSION 2:  Biomedical Conditions and Complaints  The degree to which any physical disorder would interfere with treatment for substance abuse and the client's ability to tolerate any related discomfort     Biomedical Conditions and Complaints - Current Risk Factor:  0    Goals:  Obtain a neurological examination.  Disclose CD status to medical providers and follow up with medical interventions while in IOP.  Maintain good physical health.    Data: Client denies chronic biomedical conditions which would interfere with treatment.  She reports medical conditions of hypothyroidism, acid reflux, allergy to bees, and periodic limited and minor seizure episodes without loss of consciousness.  Client reports having a PCP and sees an addiction specialist monthly.  She reports medication compliance with Levothyroxine, Famotidine, and Suboxone. She takes 2.5 8mg strips of Suboxone per day and has since 2013.     On Monday client denies any new or worsening medical conditions.  She denies any exercise.    DIMENSION 3:  Emotional/Behavioral/Cognitive Conditions and Complications  The degree to which any  "condition or complications are likely to interfere with treatment for substance abuse or with function in significant life areas and the likelihood of risk of harm to self or others.     Emotional/Behavioral - Current Risk Factor:  2    DSM-5 Diagnoses:   Client reports feelings of anxiety, depression, trauma and grief and loss.  Therapist will do a diagnostic interview to update information       Suicide Assessment: very low risk  Risk Status    Ideation - Active thoughts of suicide Intent to follow through on suicide Plan for completing suicide    Yes No Yes No Yes No   Emergent  x  x  x   Urgent / Non-Emergent  x  x  x   Non- Urgent  x  x  x   No Current/Active Risk  x  x  x          Goals: Learn how to apply self-care and psychotherapy to build a repertoire of daily habits that counteract PAWS, fatigue, depression and anxiety leading to increased resiliency and well-being.  Schedule a mental health diagnostic assessment.  Understand the relationship between mental health concerns and addiction.     Data: Client reports mental health concerns of anxiety, depression, trauma and grief and loss.  Client denies current or past mental health diagnoses or psychotropic medications.  EMR indicates past diagnosis of anxiety and historical medication with Lexapro.  On 2/04/2019, client s PHQ-9 score was 3 out of 27, indicating minimal depression, and ZAID-7 score was 4 out of 21, indicating minimal anxiety.  Client's protective factors include:       Having people in his/her life that would prevent the patient from considering a suicide attempt (i.e. young children, spouse, parents, etc.)     Having easy access to supportive family members.     On Monday client rates her depression, anxiety, or other mental health concerns as 2 of 10 (10 highest), due to \"just still moving stress\".  Client denies any thoughts of hurting herself or others.  client reports she is feeling hopeful and grateful.  Client missed her psychotherapy " "appt with Mayda Taylor due to schedule conflict.    DIMENSION 4:  Readiness to Change  Consider the amount of support and encouragement necessary to keep the client involved in treatment.     Readiness to Change - Current Risk Factor:  1    Goals:  Understand the impact your substance use has had on you, your family, and significant relationships.  Increase internal motivation to change.    Data: On Monday client rates her motivation for recovery as 9 of 10 (10 highest).  Client identified 3 reasons she wants to remain sober.       DIMENSION 5:  Relapse/Continued Use/Continued Problem Potential  Consider the degree to which the client recognizes relapse issues and has the skills to prevent relapse of either substance use or mental health problems.     Relapse/Continued Use/Continued Problem Potential - Current Risk Factor:  3    Goals:  Identify and understand personal relapse and self-sabotage patterns.  Identify personal triggers and relapse warning signs.  Develop sober coping and living skills in order to address the development of a strategy for long term recovery.    Data: Client reports approximately 15 detox admissions and 7 prior CD treatment experiences with 6 completed.  She reports a 2-year period of sobriety in the past followed by two 1-year periods.  Client identifies a pattern of having a year of sobriety and relapsing for a month.  She reports she has been unable to attain sobriety on her own.    3/11/19:  Client stated benzo's are \"fluttering in the back of my mind when I am super anxious.\"     On Monday client rates her strongest craving to use in the past week as 0 of 10 (10 highest).  Client states \"moved\" as something she did to change her thinking and behaviors for the sake of her sobriety.  She identifies a strength of networking, and a coping skill of communication.    DIMENSION 6:  Recovery Environment  Consider the degree to which key areas of the client's life are supportive of or " "antagonistic to treatment participation and recovery.     Recovery Environment - Current Risk Factor:  3    Support group attended this week:  Yes    Did family agree to attend family week:  yes    If yes: completed 2/25/19 and 3/4/19.    Goals:  Develop a sober support network.  Increase sober support network. Identify and attend sober support group meetings.  Heal relationship with boyfriend.    Data: Client is unemployed but seeking work.  Client lives with her non-using boyfriend who she describes as supportive.      On Monday client reports attendance at 3 sober support meetings in the past week.  Client reports having found a sponsor.  Client states \"new environment, moved\".  Client participated in sober activity of work.          Intervention:   Behavioral Therapy, Cognitive Behavioral Therapy, Counselor Feedback, Psychoeducation, Emotional management, Group Feedback, Motivational Interviewing, Relapse Prevention, Twelve Step Facilitation, REBT.    On Monday, client participated with the check- in process and affirmation.  Readiness to Change, Relapse Prevention, and Recovery Planning were addressed through group peer presentation of their  10 Worst Consequences  assignment with discussion.  Client participated, provided supportive feedback, and spoke about how she related to behaviors, thoughts, and feelings described in peer s assignment.  Client participated in an activity to identify personal strengths, and determined which she most possessed, somewhat possessed, and which were not applicable to her.  On Tuesday, Relapse Prevention and Motivation to Change were addressed through discussion and feedback regarding group peer s recent use episode.  She expressed support for peer and cited her recent use episode as a learning experience.   Positive Psychology was addressed. Client viewed a video, read a psych-ed instructional paper on topic and discussed in group.  Client actively participated in discussion " and shared how she tried to maintain a positive attitude.  Client practiced 4x4 and natural mindful breathing.  Client expressed something she was grateful for.  On Wednesday clinic was closed due to weather.    Assessment:  Stages of Change Model  Contemplation    Plan:   Attend psychotherapy with Mayda Alejandro.  Practice awareness of current state of mind.  Prepare to present remaining assignments.  Practice 4x4 breathing 3 times daily before next session.    KYRA Saucedo

## 2019-04-12 NOTE — ADDENDUM NOTE
Encounter addended by: Edson Lopez, [facility] on: 4/12/2019 11:55 AM   Actions taken: Delete clinical note

## 2019-04-16 ENCOUNTER — HOSPITAL ENCOUNTER (OUTPATIENT)
Dept: BEHAVIORAL HEALTH | Facility: CLINIC | Age: 29
End: 2019-04-16
Attending: SOCIAL WORKER
Payer: COMMERCIAL

## 2019-04-16 PROCEDURE — H2035 A/D TX PROGRAM, PER HOUR: HCPCS | Performed by: SOCIAL WORKER

## 2019-04-16 PROCEDURE — H2035 A/D TX PROGRAM, PER HOUR: HCPCS | Mod: HQ

## 2019-04-16 NOTE — PROGRESS NOTES
Progress Note for Individual Session from 4:20 PM - 5:20 PM    D: Shefali was 20 minutes late for her appt due to several other appts that were scheduled today.  Shefali had a brighter affect today and reported she was doing  OK, I really like that book  as she is reading Adult Children of Alcoholics and she felt it touched on many characteristics she has.  She will continue to read this and discuss in sessions. She has lived a week renting a room in a home and she said she gets lonely sometimes but other times she enjoys being able to rest and be by herself.  She had more chest pain that she has been promising to follow up on for many weeks. This time she did go to ED and had an EKG  That was a little abnormal but he didn t think I needed a CAT scan.  She is to follow up this time with her PCP who is at [facility].  Her SO  has been somewhat supportive of me  but this changes daily.  She was not doing her journal or her mindfulness and we reviewed a mindfulness exercise together today and reviewed the purpose of the journal entries.      I: Supportive and motivational therapy, ACT, mindfulness, safety assessment and review of symptoms and progress.     A: Shefali is doing well and said her stress this week has gone from  my usual 6- 8 to 2 or 3.  She is looking for a sober home in [facility] due to probation requirements and will call one in [place]. She completed a ZAID-7 and PHQ-9 with following results:  ZAID-7 2/04/19 = 4 4/16/19 = 3   PHQ-9 2/04/19 = 3 4/16/19 = 1    Even though her new living arrangements are not as pleasant as she would like, she refrained from going back to the more convenient situation with her ex that was no longer comfortable and where she didn t feel safe.  She is reading the book on children of parents with addiction and said she is learning a lot from this. She completed a hands-on mindfulness exercise today. Shefali also said she is not going to visit her family much as they smoke  weed  around  her and she fears this triggering her and she leaves feeling angry. She is okay when he meets them away from their home.     P: Shefali has made a lot of progress is making changes conducive to recovery. She has moved out of a dysfunctional environment and is working part-time. She is also looking into a sober house and an additional job in [place] closer to her family and stated that she realizes these all look like small steps but they are adding up. She will continue to read her book and do the mindfulness exercise she practiced today at least 5 minutes a day at least 5 days a week.

## 2019-04-16 NOTE — ADDENDUM NOTE
Encounter addended by: Mayda Alejandro [facility] on: 4/16/2019 4:35 PM   Actions taken: Delete clinical note

## 2019-04-23 ENCOUNTER — HOSPITAL ENCOUNTER (OUTPATIENT)
Dept: BEHAVIORAL HEALTH | Facility: CLINIC | Age: 29
End: 2019-04-23
Attending: SOCIAL WORKER
Payer: COMMERCIAL

## 2019-04-23 PROCEDURE — H2035 A/D TX PROGRAM, PER HOUR: HCPCS | Mod: HQ

## 2019-04-24 ENCOUNTER — HOSPITAL ENCOUNTER (OUTPATIENT)
Dept: BEHAVIORAL HEALTH | Facility: CLINIC | Age: 29
End: 2019-04-24
Attending: SOCIAL WORKER
Payer: COMMERCIAL

## 2019-04-24 PROCEDURE — H2035 A/D TX PROGRAM, PER HOUR: HCPCS | Mod: HQ

## 2019-04-25 NOTE — PROGRESS NOTES
CD ADULT Progress Note     Underlined portions in a dimension indicate important information carried forward from week to week as a reminder.     Treatment Plan Review completed on:  4/25/2019     Attendance Dates:  4/23/19 and 4/24/19    Total # of Group Sessions:  Phase I:  24 plus service initiation and treatment planning sessions                Phase 2:   6    Length of stay/projected discharge date: 6/13/2019 MONDAY TUESDAY WEDNESDAY THURSDAY FRIDAY SATURDAY SUNDAY Total   Group Therapy 0 hours  2 hours     2 hours   Specialty Groups*  2 hours      2 hours   1:1           Family Program           [place]             Phase III             Absent excused          Total 0 hours 2 hours 2 hours     4 hours     *Specialty Groups include Mental Health Care, Assertiveness and Communication, Sobriety Maintenance Skills, Spiritual Care, Stress Management, Relapse Prevention, Family Systems.                    Learning Style:  by reading    Staff member contributing: BUSTER Best, [facility]     Received supervision:  no     Client: contributed to goals and plan    Did Client receive a copy of treatment plan/revised plan: Yes; signed 2/6/2019    Changes to Treatment Plan: No    Client agrees with plan/revised plan: Yes    Any changes in Vulnerable Adult Status?  No   (If yes, add to treatment plan and individual abuse prevention plan).    Substance Use Disorders:    Opioid Use Disorder, Severe   (F11.20) (304.00)  Sedative, Hypnotic, or Anxiolytic Use Disorder, Severe  (F13.20) (304.10)  Stimulant Related Disorder, Severe   (F15.20) (304.40) Amphetamine type substance  Tobacco Use Disorder, Moderate   (F17.200) (305.1)    1) Care Coordination Activities:  conferred with ZHANNA Ely, [facility]  2) Medical, Mental Health and other appointments the client attended: ER 4/18/19 for pneumonia; psychotherapy appt with Mayda Alejandro 4/16/19  3) Medication issues: suboxone maintenance 20 mg/daily  4) Physical and mental health  "problems: See dimensions 2 and 3 below for further details.  5) Review and evaluation of the individual abuse prevention plan: NA    ASAM Risk Ratings and Data       DIMENSION 1: Acute Intoxication/Withdrawal  The client's ability to cope with withdrawal symptoms and current state of intoxication       Acute Intoxication/Withdrawal - Current Risk Factor:  1    Reporting sober date of , 3/26/2019    Goals:  Develop effective strategies to maintain sobriety.  Continue Suboxone maintenance.  Report to counselor and group any alcohol or substance use.     Data: Client reports last use of heroin, methamphetamine and benzodiazapine as 10/24/2018.  She is on Suboxone maintenance of 20 mg daily.    3/26/19:  Client reports last use of alcohol as 3/25/2019.    Client exhibits no signs of intoxication or withdrawal throughout the week.      DIMENSION 2:  Biomedical Conditions and Complaints  The degree to which any physical disorder would interfere with treatment for substance abuse and the client's ability to tolerate any related discomfort     Biomedical Conditions and Complaints - Current Risk Factor:  0    Goals:  Obtain a neurological examination.  Disclose CD status to medical providers and follow up with medical interventions while in IOP.  Maintain good physical health.    Data: Client denies chronic biomedical conditions which would interfere with treatment.  She reports medical conditions of hypothyroidism, acid reflux, allergy to bees, and periodic limited and minor seizure episodes without loss of consciousness.  Client reports having a PCP and sees an addiction specialist monthly.  She reports medication compliance with Levothyroxine, Famotidine, and Suboxone. She takes 2.5 8mg strips of Suboxone per day and has since 2013.     On Tuesday client denies any new or worsening medical conditions \"other than my bit of pneumonia\".  Client exhibits no symptoms.  She reports exercise.    DIMENSION 3:  " "Emotional/Behavioral/Cognitive Conditions and Complications  The degree to which any condition or complications are likely to interfere with treatment for substance abuse or with function in significant life areas and the likelihood of risk of harm to self or others.     Emotional/Behavioral - Current Risk Factor:  2    DSM-5 Diagnoses:   Client reports feelings of anxiety, depression, trauma and grief and loss.  Therapist will do a diagnostic interview to update information       Suicide Assessment: very low risk  Risk Status    Ideation - Active thoughts of suicide Intent to follow through on suicide Plan for completing suicide    Yes No Yes No Yes No   Emergent  x  x  x   Urgent / Non-Emergent  x  x  x   Non- Urgent  x  x  x   No Current/Active Risk  x  x  x          Goals: Learn how to apply self-care and psychotherapy to build a repertoire of daily habits that counteract PAWS, fatigue, depression and anxiety leading to increased resiliency and well-being.  Schedule a mental health diagnostic assessment.  Understand the relationship between mental health concerns and addiction.     Data: Client reports mental health concerns of anxiety, depression, trauma and grief and loss.  Client denies current or past mental health diagnoses or psychotropic medications.  EMR indicates past diagnosis of anxiety and historical medication with Lexapro.  On 2/04/2019, client s PHQ-9 score was 3 out of 27, indicating minimal depression, and ZAID-7 score was 4 out of 21, indicating minimal anxiety.  Client's protective factors include:       Having people in his/her life that would prevent the patient from considering a suicide attempt (i.e. young children, spouse, parents, etc.)     Having easy access to supportive family members.     On Tuesday client rates her depression, anxiety, or other mental health concerns as 1 of 10 (10 highest), due to \"good week\".  Client denies any thoughts of hurting herself or others.  client reports " "she is feeling anxious because she was running late, but otherwise happy and content.  Client attended psychotherapy appt with Mayda Alejandro 4/16/19.    DIMENSION 4:  Readiness to Change  Consider the amount of support and encouragement necessary to keep the client involved in treatment.     Readiness to Change - Current Risk Factor:  1    Goals:  Understand the impact your substance use has had on you, your family, and significant relationships.  Increase internal motivation to change.    Data: On Tuesday client rates her motivation for recovery as 10 of 10 (10 highest).  Client identified 3 reasons she wants to remain sober.       DIMENSION 5:  Relapse/Continued Use/Continued Problem Potential  Consider the degree to which the client recognizes relapse issues and has the skills to prevent relapse of either substance use or mental health problems.     Relapse/Continued Use/Continued Problem Potential - Current Risk Factor:  3    Goals:  Identify and understand personal relapse and self-sabotage patterns.  Identify personal triggers and relapse warning signs.  Develop sober coping and living skills in order to address the development of a strategy for long term recovery.    Data: Client reports approximately 15 detox admissions and 7 prior CD treatment experiences with 6 completed.  She reports a 2-year period of sobriety in the past followed by two 1-year periods.  Client identifies a pattern of having a year of sobriety and relapsing for a month.  She reports she has been unable to attain sobriety on her own.    3/11/19:  Client stated benzo's are \"fluttering in the back of my mind when I am super anxious.\"     On Tuesday client rates her strongest craving to use in the past week as 0 of 10 (10 highest).  Client states \"participated in sober activities.  Playing at the park with my nephew, hiking, dog park\" as some things she did to change her thinking and behaviors for the sake of her sobriety.  She identifies " "strengths of resilient, strong, caring.  She reports using coping skills of doing things outside.    DIMENSION 6:  Recovery Environment  Consider the degree to which key areas of the client's life are supportive of or antagonistic to treatment participation and recovery.     Recovery Environment - Current Risk Factor:  3    Support group attended this week:  Yes    Did family agree to attend family week:  yes    If yes: completed 2/25/19 and 3/4/19.    Goals:  Develop a sober support network.  Increase sober support network. Identify and attend sober support group meetings.  Heal relationship with boyfriend.    Data: Client is unemployed but seeking work.  Client lives with her non-using boyfriend who she describes as supportive.      On Monday client reports attendance at 3 sober support meetings in the past week.  Client reports having found a sponsor.  Client states \"I'm moving again, but it's a good thing\".  Client participated in sober activities of \"fellowship with sponsor and women at the womens' meeting.          Intervention:   Behavioral Therapy, Cognitive Behavioral Therapy, Counselor Feedback, Psychoeducation, Emotional management, Group Feedback, Motivational Interviewing, Relapse Prevention, Twelve Step Facilitation, REBT, DBT.    On Monday, client was absent (excused).  On Tuesday, communication skills were presented and processed with group peers.  Client expressed better understanding of assertive communication rather than aggressive, passive, or passive-aggressive styles.  Client completed an assertiveness questionnaire and identified as having a fairly assertive approach.  On Wednesday, Readiness to Change, Relapse Prevention, and Recovery Planning were addressed through group peer presentation of their  10 Worst Consequences  assignment with discussion.  Client participated, provided supportive feedback, and spoke about how she related to behaviors, thoughts, and feelings described in peer s " assignment.  Client participated in self-soothing exercises.  Client practiced 4x4 and natural mindful breathing.  Client participated in a  Check your Breathing  exercise.  Client expressed something she was grateful for.      Assessment:  Stages of Change Model  Contemplation    Plan:   Practice awareness of current state of mind.  Prepare to present remaining assignments.  Practice 4x4 breathing 3 times daily before next session.    Edson Lopez [facility]

## 2019-04-29 ENCOUNTER — HOSPITAL ENCOUNTER (OUTPATIENT)
Dept: BEHAVIORAL HEALTH | Facility: CLINIC | Age: 29
End: 2019-04-29
Attending: SOCIAL WORKER
Payer: COMMERCIAL

## 2019-04-29 PROCEDURE — H2035 A/D TX PROGRAM, PER HOUR: HCPCS | Mod: HQ

## 2019-04-30 ENCOUNTER — HOSPITAL ENCOUNTER (OUTPATIENT)
Dept: BEHAVIORAL HEALTH | Facility: CLINIC | Age: 29
End: 2019-04-30
Attending: SOCIAL WORKER
Payer: COMMERCIAL

## 2019-04-30 PROCEDURE — H2035 A/D TX PROGRAM, PER HOUR: HCPCS | Mod: HQ

## 2019-05-07 ENCOUNTER — HOSPITAL ENCOUNTER (OUTPATIENT)
Dept: BEHAVIORAL HEALTH | Facility: CLINIC | Age: 29
End: 2019-05-07
Attending: SOCIAL WORKER
Payer: COMMERCIAL

## 2019-05-07 PROCEDURE — H2035 A/D TX PROGRAM, PER HOUR: HCPCS | Mod: HQ

## 2019-05-08 ENCOUNTER — HOSPITAL ENCOUNTER (OUTPATIENT)
Dept: BEHAVIORAL HEALTH | Facility: CLINIC | Age: 29
End: 2019-05-08
Attending: SOCIAL WORKER
Payer: COMMERCIAL

## 2019-05-08 PROCEDURE — H2035 A/D TX PROGRAM, PER HOUR: HCPCS | Mod: HQ

## 2019-05-09 NOTE — PROGRESS NOTES
CD ADULT Progress Note     Underlined portions in a dimension indicate important information carried forward from week to week as a reminder.     Treatment Plan Review completed on:  5/9/2019    Attendance Dates:  5/7/19, 5/8/19   Total # of Group Sessions:  Phase I:  24 sessions plus service initiation and treatment planning sessions                                        Phase 2:   10 sessions    Length of stay/projected discharge date: 6/13/2019 MONDAY TUESDAY WEDNESDAY THURSDAY FRIDAY SATURDAY SUNDAY Total   Group Therapy 0 hours  2 hours     2 hours   Specialty Groups*  2 hours      2 hours   1:1           Family Program           [place]             Phase III             Absent excused          Total 0 hours 2 hours 2 hours     4 hours     *Specialty Groups include Mental Health Care, Assertiveness and Communication, Sobriety Maintenance Skills, Spiritual Care, Stress Management, Relapse Prevention, Family Systems.                    Learning Style:  by reading    Staff member contributing: BUSTER Best, [place]     Received supervision:  no     Client: contributed to goals and plan    Did Client receive a copy of treatment plan/revised plan: Yes; signed 2/6/2019    Changes to Treatment Plan: No    Client agrees with plan/revised plan: Yes    Any changes in Vulnerable Adult Status?  No   (If yes, add to treatment plan and individual abuse prevention plan).    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  5) Review and evaluation of the individual abuse prevention plan: NA    ASAM Risk Ratings and Data       DIMENSION 1: Acute Intoxication/Withdrawal  The client's ability to cope with withdrawal symptoms and current state of intoxication       Acute Intoxication/Withdrawal - Current Risk Factor:  1    Reporting sober date of , 3/26/2019    Goals:  Develop effective strategies to maintain sobriety.  Continue Suboxone maintenance.  Report to counselor and group any alcohol or substance use.     Data: Client reports last use of heroin, methamphetamine and benzodiazapine as 10/24/2018.  She is on Suboxone maintenance of 20 mg daily.    3/26/19:  Client reports last use of alcohol as 3/25/2019.    Client exhibits no signs of intoxication or withdrawal throughout the week.  Client denies any current symptoms of withdrawal or PAWS.    DIMENSION 2:  Biomedical Conditions and Complaints  The degree to which any physical disorder would interfere with treatment for substance abuse and the client's ability to tolerate any related discomfort     Biomedical Conditions and Complaints - Current Risk Factor:  0    Goals:  Obtain a neurological examination.  Disclose CD status to medical providers and follow up with medical interventions while in IOP.  Maintain good physical health.    Data: Client denies chronic biomedical conditions which would interfere with treatment.  She reports medical conditions of hypothyroidism, acid reflux, allergy to bees, and periodic limited and minor seizure episodes without loss of consciousness.  Client reports having a PCP and sees an addiction specialist monthly.  She reports medication compliance with Levothyroxine, Famotidine, and Suboxone. She takes 2.5 8mg strips of Suboxone per day and has since 2013.     On Tuesday, client denied any new or worsening physical problems and denies any current biomedical  "appointments.  She reports meeting her exercise goal this week.    DIMENSION 3:  Emotional/Behavioral/Cognitive Conditions and Complications  The degree to which any condition or complications are likely to interfere with treatment for substance abuse or with function in significant life areas and the likelihood of risk of harm to self or others.     Emotional/Behavioral - Current Risk Factor:  2    DSM-5 Diagnoses:   Client reports feelings of anxiety, depression, trauma and grief and loss.  Therapist will do a diagnostic interview to update information     Suicide Assessment: very low risk    Goals: Learn how to apply self-care and psychotherapy to build a repertoire of daily habits that counteract PAWS, fatigue, depression and anxiety leading to increased resiliency and well-being.  Schedule a mental health diagnostic assessment.  Understand the relationship between mental health concerns and addiction.     Data: Client reports mental health concerns of anxiety, depression, trauma and grief and loss.  Client denies current or past mental health diagnoses or psychotropic medications.  EMR indicates past diagnosis of anxiety and historical medication with Lexapro.  On 2/04/2019, client s PHQ-9 score was 3 out of 27, indicating minimal depression, and ZAID-7 score was 4 out of 21, indicating minimal anxiety.  Client's protective factors include:       Having people in his/her life that would prevent the patient from considering a suicide attempt (i.e. young children, spouse, parents, etc.)     Having easy access to supportive family members.     On Tuesday, client rates her depression, anxiety, or other mental health concerns as ranging from 0-2 of 10 (10 highest) due to \"just moving again, getting acclimated to my new routine, etc\".  Client denies any thoughts of hurting herself or others.  She reports feeling calm and resourceful.    DIMENSION 4:  Readiness to Change  Consider the amount of support and " "encouragement necessary to keep the client involved in treatment.     Readiness to Change - Current Risk Factor:  0    Goals:  Understand the impact your substance use has had on you, your family, and significant relationships.  Increase internal motivation to change.    Data: On Tuesday client rates her motivation for recovery as 10 of 10 (10 highest).  Client identified 3 reasons she wants to remain sober.       DIMENSION 5:  Relapse/Continued Use/Continued Problem Potential  Consider the degree to which the client recognizes relapse issues and has the skills to prevent relapse of either substance use or mental health problems.     Relapse/Continued Use/Continued Problem Potential - Current Risk Factor:  2    Goals:  Identify and understand personal relapse and self-sabotage patterns.  Identify personal triggers and relapse warning signs.  Develop sober coping and living skills in order to address the development of a strategy for long term recovery.    Data: Client reports approximately 15 detox admissions and 7 prior CD treatment experiences with 6 completed.  She reports a 2-year period of sobriety in the past followed by two 1-year periods.  Client identifies a pattern of having a year of sobriety and relapsing for a month.  She reports she has been unable to attain sobriety on her own.    3/11/19:  Client stated benzo's are \"fluttering in the back of my mind when I am super anxious.\"     On Tuesday, client rates her strongest craving to use in the past week as 0 of 10 (10 highest).  Client states \"no triggers\".  Client states \"be very honest in ORP, came up with objectives that are important for my recovery\" as some things she did to change her thinking and behaviors for the sake of her sobriety.  She identifies strengths of open to change, teachable.  She reports using coping skills of assertive communication.  Risk rating lowered due to use of learned coping skills.    DIMENSION 6:  Recovery " "Environment  Consider the degree to which key areas of the client's life are supportive of or antagonistic to treatment participation and recovery.     Recovery Environment - Current Risk Factor:  1    Support group attended this week:  Yes    Did family agree to attend family week:  yes    If yes: completed 2/25/19 and 3/4/19.    Goals:  Develop a sober support network.  Increase sober support network. Identify and attend sober support group meetings.  Heal relationship with boyfriend.    Data: Client is unemployed but seeking work.  Client lives with her non-using boyfriend who she describes as supportive.      On Tuesday, client reports attendance at 3 sober support meetings in the past week.  Client reports having a sponsor.  Client states things are going \"good.  My new place is peaceful\", but it is being sold so she will have to move again.  Client participated in sober activities of \"fellowship\".  Client reports she is enjoying her new job and is now fully trained.  Risk rating lowered.         Intervention:   Behavioral Therapy, Cognitive Behavioral Therapy, Counselor Feedback, Psychoeducation, Emotional management, Group Feedback, Motivational Interviewing, Relapse Prevention, Twelve Step Facilitation, REBT, DBT.    On Monday, client had an excused absence.  On Tuesday, client actively participated in a group exercise to identify symptoms of Post Acute Withdrawal Syndrome.   Client expressed which symptoms had affected them personally.  Client received psychoeducation and participated in discussion of post-acute withdrawal symptoms and why they increased susceptibility to relapse.  On Wednesday, Readiness to Change, Relapse Prevention, and Recovery Planning were addressed through further discussion of PAWS and Developing coping skills to deal with PAWS. We discussed identifying, disputing, and re-framing automatic negative thoughts using CBT and REBT techniques.  Client gave examples of ways to reframe " underlying beliefs.  Client participated in a discussion of how to fill the void left which used to be filled with substance use, with emphasis on spirituality.  Client expressed that spirituality was an important component of recovery.  Client participated in self-soothing exercises.  Client practiced 4x4 and natural mindful breathing.  Client expressed something she was grateful for.  Client affirmed and supported for reducing the level of stress in her life.     Assessment:  Stages of Change Model  Contemplation    Plan:   Practice awareness of current state of mind.  Prepare to present remaining assignments.  Practice meditation techniques and share reflections with group.    Edson Lopez, MPS, LADC

## 2019-05-13 ENCOUNTER — HOSPITAL ENCOUNTER (OUTPATIENT)
Dept: BEHAVIORAL HEALTH | Facility: CLINIC | Age: 29
End: 2019-05-13
Attending: SOCIAL WORKER
Payer: COMMERCIAL

## 2019-05-13 PROCEDURE — H2035 A/D TX PROGRAM, PER HOUR: HCPCS | Mod: HQ

## 2019-05-14 ENCOUNTER — HOSPITAL ENCOUNTER (OUTPATIENT)
Dept: BEHAVIORAL HEALTH | Facility: CLINIC | Age: 29
End: 2019-05-14
Attending: SOCIAL WORKER
Payer: COMMERCIAL

## 2019-05-14 PROCEDURE — H2035 A/D TX PROGRAM, PER HOUR: HCPCS | Mod: HQ

## 2019-05-14 NOTE — PROGRESS NOTES
CD ADULT Progress Note     Underlined portions in a dimension indicate important information carried forward from week to week as a reminder.     Treatment Plan Review completed on:  5/15/2019    Attendance Dates:  5/13/19, 5/14/19 and 5/15/19  Total # of Group Sessions: Phase I: 24 sessions plus service initiation and treatment planning sessions                                                  Phase 2:   13 sessions    Length of stay/projected discharge date: 6/13/2019 MONDAY TUESDAY WEDNESDAY THURSDAY FRIDAY SATURDAY SUNDAY Total   Group Therapy 2 hours  2 hours     4 hours   Specialty Groups*  2 hours      2 hours   1:1           Family Program           [place]             Phase III             Absent           Total 2 hours 2 hours 2 hours     6 hours     *Specialty Groups include Mental Health Care, Assertiveness and Communication, Sobriety Maintenance Skills, Spiritual Care, Stress Management, Relapse Prevention, Family Systems.                    Learning Style:  by reading    Staff member contributing: BUSTER Best, [place]     Received supervision:  no     Client: contributed to goals and plan    Did Client receive a copy of treatment plan/revised plan: Yes; signed 2/6/2019    Changes to Treatment Plan: No    Client agrees with plan/revised plan: Yes    Any changes in Vulnerable Adult Status?  No   (If yes, add to treatment plan and individual abuse prevention plan).    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  5) Review and evaluation of the individual abuse prevention plan: [facility]    ASAM Risk Ratings and Data       DIMENSION 1: Acute Intoxication/Withdrawal  The client's ability to cope with withdrawal symptoms and current state of intoxication       Acute Intoxication/Withdrawal - Current Risk Factor:  1    Reporting sober date of 10/25/2018 opioids, benzos, stimulants; 3/26/2019 alcohol    Goals:  Develop effective strategies to maintain sobriety.  Continue Suboxone maintenance.  Report to counselor and group any alcohol or substance use.     Data: Client reports last use of heroin, methamphetamine and benzodiazapine as 10/24/2018.  She is on Suboxone maintenance of 20 mg daily.    3/26/19:  Client reports last use of alcohol as 3/25/2019.    Client exhibits no signs of intoxication or withdrawal throughout the week.  Client denies any current symptoms of withdrawal or PAWS.    DIMENSION 2:  Biomedical Conditions and Complaints  The degree to which any physical disorder would interfere with treatment for substance abuse and the client's ability to tolerate any related discomfort     Biomedical Conditions and Complaints - Current Risk Factor:  0    Goals:  Obtain a neurological examination.  Disclose CD status to medical providers and follow up with medical interventions while in IOP.  Maintain good physical health.    Data: Client denies chronic biomedical conditions which would interfere with treatment.  She reports medical conditions of hypothyroidism, acid reflux, allergy to bees, and periodic limited and minor seizure episodes without loss of consciousness.  Client reports having a PCP and sees an addiction specialist monthly.  She reports medication compliance with Levothyroxine, Famotidine, and Suboxone. She takes 2.5 8mg strips of Suboxone per day and has since 2013.     On Monday  "client denied any new or worsening physical problems other than \"just tired.  Some sleep trouble\".  She  denies any current biomedical appointments.  She reports meeting her exercise goal this week.    DIMENSION 3:  Emotional/Behavioral/Cognitive Conditions and Complications  The degree to which any condition or complications are likely to interfere with treatment for substance abuse or with function in significant life areas and the likelihood of risk of harm to self or others.     Emotional/Behavioral - Current Risk Factor:  2    DSM-5 Diagnoses:   Client reports feelings of anxiety, depression, trauma and grief and loss.  Therapist will do a diagnostic interview to update information     Suicide Assessment: very low risk    Goals: Learn how to apply self-care and psychotherapy to build a repertoire of daily habits that counteract PAWS, fatigue, depression and anxiety leading to increased resiliency and well-being.  Schedule a mental health diagnostic assessment.  Understand the relationship between mental health concerns and addiction.     Data: Client reports mental health concerns of anxiety, depression, trauma and grief and loss.  Client denies current or past mental health diagnoses or psychotropic medications.  EMR indicates past diagnosis of anxiety and historical medication with Lexapro.  On 2/04/2019, client s PHQ-9 score was 3 out of 27, indicating minimal depression, and ZAID-7 score was 4 out of 21, indicating minimal anxiety.  Client's protective factors include:       Having people in his/her life that would prevent the patient from considering a suicide attempt (i.e. young children, spouse, parents, etc.)     Having easy access to supportive family members.     On Monday client rates her depression, anxiety, or other mental health concerns as ranging from 2 of 10 (10 highest) due to \"just a small manageable amount of stress with a new job\".  Client denies any thoughts of hurting herself or others.  " "She reports feeling exhausted physically, but exhilarated because she is beginning to enjoy living alone.  Client missed her scheduled psychotherapy appt but said she is going to schedule one tomorrow.  Client has begun to be more punctual in attendance in group.  Time management discussed as a stress reliever.  Client agreed that allowing more time made her feel more relaxed.    DIMENSION 4:  Readiness to Change  Consider the amount of support and encouragement necessary to keep the client involved in treatment.     Readiness to Change - Current Risk Factor:  0    Goals:  Understand the impact your substance use has had on you, your family, and significant relationships.  Increase internal motivation to change.    Data: On Monday client rates her motivation for recovery as 10 of 10 (10 highest).  Client identified 3 reasons she wants to remain sober.       DIMENSION 5:  Relapse/Continued Use/Continued Problem Potential  Consider the degree to which the client recognizes relapse issues and has the skills to prevent relapse of either substance use or mental health problems.     Relapse/Continued Use/Continued Problem Potential - Current Risk Factor:  1    Goals:  Identify and understand personal relapse and self-sabotage patterns.  Identify personal triggers and relapse warning signs.  Develop sober coping and living skills in order to address the development of a strategy for long term recovery.    Data: Client reports approximately 15 detox admissions and 7 prior CD treatment experiences with 6 completed.  She reports a 2-year period of sobriety in the past followed by two 1-year periods.  Client identifies a pattern of having a year of sobriety and relapsing for a month.  She reports she has been unable to attain sobriety on her own.    3/11/19:  Client stated benzo's are \"fluttering in the back of my mind when I am super anxious.\"     On Monday client rates her strongest craving to use in the past week as 0 of 10 " "(10 highest).  Client states \"I had a bad using dream with pot but I was more grateful than anything\".  Client states \"checked out a great meeting, found a new sponsor that I think may be a better fit\" as some things she did to change her thinking and behaviors for the sake of her sobriety.  She identifies a personal strength of kinga.  She reports using coping skill of utilizing her network.  She has been more punctual in attending groups.  Risk rating lowered.    DIMENSION 6:  Recovery Environment  Consider the degree to which key areas of the client's life are supportive of or antagonistic to treatment participation and recovery.     Recovery Environment - Current Risk Factor:  1    Support group attended this week:  Yes    Did family agree to attend family week:  yes    If yes: completed 2/25/19 and 3/4/19.    Goals:  Develop a sober support network.  Increase sober support network. Identify and attend sober support group meetings.  Heal relationship with boyfriend.    Data: Client is unemployed but seeking work.  Client lives with her non-using boyfriend who she describes as supportive.      On Monday client reports attendance at 4 sober support meetings in the past week.  Client reports having a sponsor.  Client states \"things are going well.  i'm excited to work and save to get a place of my own\".    Client participated in sober activities of \"fellowship after a meeting\".           Intervention:   Behavioral Therapy, Cognitive Behavioral Therapy, Counselor Feedback, Psychoeducation, Emotional management, Group Feedback, Motivational Interviewing, Relapse Prevention, Twelve Step Facilitation, REBT, DBT.    On Monday, client participated with the check- in process and affirmation.  Readiness to Change, Relapse Prevention, and Recovery Planning were addressed through group peer presentation of his  Symptoms  assignment with discussion.  Client participated, provided supportive feedback, and spoke about how she " related to behavioral, emotional, and physical symptoms and signs described in peer s assignment.   Client and group read and discussed Jesus s  The Power of Belief .  Client expressed awareness of times she had been in denial, and not considering other [id number].  On Tuesday, client actively participated in a group exercise to identify triggers and described which were personally dangerous for her.   Client received psychoeducation and participated in discussion of the Relapse Cycle.   Client discussed personally relevant triggers, what thoughts she had when experiencing triggers, what cravings felt like mentally and physically, and what she had told herself  to give permission to use in the past.  Client learned relapse warning signs and received tips to avoid relapse.  Client then created a Trigger Lock to help arrest the relapse cycle at the start.  Client brainstormed with group peers other possible strategies to prevent relapse.  On Wednesday, Readiness to Change, Relapse Prevention, and Recovery Planning were addressed through group peers  presentations of their  10 Worst Consequences  and  5 Ways Addiction has Impacted my Mental Health  assignments with discussion.  Client participated, provided supportive feedback, and spoke about how she related to behaviors, thoughts, and feelings described in peer s assignments.  Client participated in self-soothing exercises.  Client practiced 4x4 and natural mindful breathing.  Client expressed something she was grateful for.  Client affirmed and supported for better time management.     Assessment:  Stages of Change Model  Contemplation    Plan:   Have regular punctual attendance.  Practice awareness of current state of mind.  Prepare to present remaining assignments.  Practice meditation techniques and share reflections with group.    Edson Lopez, MPS, [facility]

## 2019-05-15 ENCOUNTER — HOSPITAL ENCOUNTER (OUTPATIENT)
Dept: BEHAVIORAL HEALTH | Facility: CLINIC | Age: 29
End: 2019-05-15
Attending: SOCIAL WORKER
Payer: COMMERCIAL

## 2019-05-15 PROCEDURE — H2035 A/D TX PROGRAM, PER HOUR: HCPCS | Mod: HQ

## 2019-05-16 NOTE — ADDENDUM NOTE
Encounter addended by: Edson Lopez [facility] on: 5/16/2019 11:30 AM   Actions taken: Sign clinical note

## 2019-05-21 ENCOUNTER — HOSPITAL ENCOUNTER (OUTPATIENT)
Dept: BEHAVIORAL HEALTH | Facility: CLINIC | Age: 29
End: 2019-05-21
Attending: SOCIAL WORKER
Payer: COMMERCIAL

## 2019-05-21 PROCEDURE — H2035 A/D TX PROGRAM, PER HOUR: HCPCS | Mod: HQ

## 2019-05-22 ENCOUNTER — HOSPITAL ENCOUNTER (OUTPATIENT)
Dept: BEHAVIORAL HEALTH | Facility: CLINIC | Age: 29
End: 2019-05-22
Attending: SOCIAL WORKER
Payer: COMMERCIAL

## 2019-05-22 PROCEDURE — H2035 A/D TX PROGRAM, PER HOUR: HCPCS | Mod: HQ

## 2019-05-22 ASSESSMENT — ANXIETY QUESTIONNAIRES
2. NOT BEING ABLE TO STOP OR CONTROL WORRYING: NOT AT ALL
5. BEING SO RESTLESS THAT IT IS HARD TO SIT STILL: NOT AT ALL
GAD7 TOTAL SCORE: 1
1. FEELING NERVOUS, ANXIOUS, OR ON EDGE: SEVERAL DAYS
3. WORRYING TOO MUCH ABOUT DIFFERENT THINGS: NOT AT ALL
7. FEELING AFRAID AS IF SOMETHING AWFUL MIGHT HAPPEN: NOT AT ALL
6. BECOMING EASILY ANNOYED OR IRRITABLE: NOT AT ALL

## 2019-05-22 ASSESSMENT — PATIENT HEALTH QUESTIONNAIRE - PHQ9
SUM OF ALL RESPONSES TO PHQ QUESTIONS 1-9: 1
5. POOR APPETITE OR OVEREATING: NOT AT ALL

## 2019-05-23 ASSESSMENT — ANXIETY QUESTIONNAIRES: GAD7 TOTAL SCORE: 1

## 2019-05-23 NOTE — PROGRESS NOTES
[facility] INTENSIVE OUTPATIENT PROGRAM  TRANSITION PROGRESS NOTE     Patient: Jennifer Stanford  MRN; [id number]   : 1990  Age: 29 year old Sex: female  IOP ADMISSION DATE: 2019  TRANSITION DATE: 19  TRANSITIONING FROM: Phase 2 TO: Phase 3  SUBSTANCE USE DISORDERS:   Opioid Use Disorder, Severe   (F11.20) (304.00)  Sedative, Hypnotic, or Anxiolytic Use Disorder, Severe  (F13.20) (304.10)  Stimulant Related Disorder, Severe   (F15.20) (304.40) Amphetamine type substance  Tobacco Use Disorder, Moderate   (F17.200) (305.1)    LAST USE DATE: Client reports last use of heroin, methamphetamine and benzodiazapine as 10/24/2018 and last use date of alcohol as 3/25/2019.    Treatment Plan Review completed on:  2019    Attendance Dates: 19, 19 and 4/3/19     Total # of P1 Group Sessions: 24 plus service initiation and treatment planning sessions  Total # of P2 Group Sessions: 15  Total # of P3 Group Sessions: 0  Total # of 1:1 Sessions: 4    Length of stay/projected discharge date: 2019 Total   Group Therapy 0 hours  2 hours     2 hours   Specialty Groups*  2 hours      2 hours   1:1           Family Program           [place]             Phase III             Absent           Total 0 hours 2 hours 2 hours     4 hours     *Specialty Groups include Mental Health Care, Assertiveness and Communication, Sobriety Maintenance Skills, Spiritual Care, Stress Management, Relapse Prevention, Family Systems.                    Learning Style(s):  By reading    Staff member contributing: KYRA Best     Received supervision: Staffed with PO Barnett     Client contributed to goals and plan: Yes    Client received a signed copy of treatment plan/revised plan: :Yes    Changes to Treatment Plan: No    Client agrees with plan/revised plan: Yes    Any changes in Vulnerable Adult Status: No    1) Care  Coordination Activities: extended insurance auth  2) Medical, Mental Health and other appointments the client attended: none  3) Medication issues: suboxone maintenance 20 mg/daily  4) Physical and mental health problems:See dimensions 2 and 3 below for further details.  5) Review and evaluation of the individual abuse prevention plan: NA  Dimension One: Acute Intoxication/Withdrawal Potential     Risk at admission to IOP: 1. Risk at transition to Phase 3: Risk Ratin.     Treatment plan goal:   Develop effective strategies to maintain sobriety.  Continue Suboxone maintenance.  Report to counselor and group any alcohol or substance use. CONTINUE     Current ASAM criteria: Client is on suboxone maintenance.     Additional comments: Client exhibits no signs of intoxication or withdrawal throughout the week.    Dimension Two: Biomedical Conditions and Complaints     Risk at admission to IOP: 0. Risk at transition to Phase 3: Risk Ratin.     Treatment plan goal:   Obtain a neurological examination.   CONTINUE  Disclose CD status to medical providers and follow up with medical interventions while in IOP.  CONTINUE   Maintain good physical health.   CONTINUE     Current ASAM criteria: stable and manageable.     Additional comments: On Tuesday client denies any new or worsening medical conditions.   Dimension Three: Emotional/Behavioral/Cognitive Conditions and Complications     Risk at admission to IOP: 2. Risk at transition to Phase 3: Risk Ratin.     Treatment plan goal:   Learn how to apply self-care and psychotherapy to build a repertoire of daily habits that counteract PAWS, fatigue, depression and anxiety leading to increased resiliency and well-being.  COMPLETE.  Schedule a mental health diagnostic assessment.  COMPLETE.   Understand the relationship between mental health concerns and addiction. CONTINUE.     Current ASAM criteria: stable, manageable and mild to moderate severity     Additional comments:  On Wednesday client reports she continues to have issues which she intends to deal with through outside therapy.  On 2019, client s PHQ-9 score was 3 out of 27, indicating minimal depression, and ZAID-7 score was 4 out of 21, indicating minimal anxiety.  At transition on 2019, client s PHQ-9 score was 1 out of 27, indicating minimal depression, and ZAID-7 score was 1 out of 21, indicating minimal anxiety.     Dimension Four: Readiness to Change     Risk at admission to IOP: 1. Risk at transition to Phase 3: Risk Ratin.     Treatment plan goal:   Understand the impact your substance use has had on you, your family, and significant relationships. CONTINUE    Increase internal motivation to change. CONTINUE.       Current ASAM criteria: cooperative, motivated and engaged in treatment.     Additional comments: On Wednesday client reports she is highly motivated for recovery.  Client identified 3 reasons she wants to remain sober.      Dimension Five: Relapse/Continues Use/Continues Problem Potential     Risk at admission to IOP: 3. Risk at transition to Phase 3: Risk Ratin.     Treatment plan goal:   Identify and understand personal relapse and self-sabotage patterns. CONTINUE.   Identify personal triggers and relapse warning signs. CONTINUE.  Develop sober coping and living skills in order to address the development of a strategy for long term recovery. CONTINUE       Current ASAM criteria: client appears to be developing coping skills and moderate to high vulnerability for further substance use problems     Additional comments:  Client has demonstrated use of coping skills to avoid relapse in spite of multiple stressors in her life.  On Wednesday client reports  personal strengths of determination and perseverance.  She reports using coping skills of attending meetings and reaching out to her sober support network.  She was introduced to a prominent advocate for recovery who has agreed to mentor  her.    Dimension Six: Recovery Environment     Risk at admission to [facility]: 3. Risk at transition to Phase 3: Risk Ratin.     Treatment plan goal:   Identify and develop a sober support network.CONTINUE.  Identify and attend sober support group meetings. CONTINUE.  Heal relationship with boyfriend. COMPLETE.       Current ASA criteria: support support level of new environment undetermined. engaged in structured and meaningful activity and client appears to be developing coping skills     Additional comments: On Wednesday client reports things at home, work and with outside support groups are manageable despite many changes.  Client reports attendance at sober support meetings in the past week.  Client reports sober activities of fellowship with people from her groups.  On Wednesday client related that she originally had a goal of saving her relationship with her then boyfriend, but has come to see that treatment has been about her own healing.  Intervention:   Behavioral Therapy, Cognitive Behavioral Therapy, Counselor Feedback, Psychoeducation, Emotional management, Group Feedback, Motivational Interviewing, Relapse Prevention, Twelve Step Facilitation, REBT, DBT.  On Monday, client had an excused absence.  On Tuesday, client actively participated in teaching back the Relapse Cycle.   Client received psychoeducation about cravings management and healthy coping skills.  How to find a convenient sober support group was demonstrated through internet searches.   On Wednesday, Readiness to Change, Relapse Prevention, and Recovery Planning were addressed through group peers  presentations of their  10 Worst Consequences  and  Symptoms  assignments with discussion.  Client participated, provided supportive feedback, and spoke about how she related to behaviors, thoughts, and feelings as well as behavioral, emotional, and physical symptoms and signs described in peer s assignments.  Client participated in self-soothing  exercises.  Client practiced 4x4 and natural mindful breathing.  Client expressed something she was grateful for.  Client affirmed and supported for transitioning to Phase 3.    Assessment:  Stages of Change Model  Preparation/determination; client reports she has begun to work with a sober mentor.    PLAN: Transition client to Phase 3 effective 5/30/19 due to progress toward goals in treatment plan.  Client currently meets the ASA criteria for; Outpatient (ASAM level 1)  level of care.    KYRA Saucedo

## 2019-05-30 ENCOUNTER — HOSPITAL ENCOUNTER (OUTPATIENT)
Dept: BEHAVIORAL HEALTH | Facility: CLINIC | Age: 29
End: 2019-05-30
Attending: SOCIAL WORKER
Payer: COMMERCIAL

## 2019-05-30 PROCEDURE — H2035 A/D TX PROGRAM, PER HOUR: HCPCS | Mod: HQ

## 2019-05-30 NOTE — PROGRESS NOTES
CD ADULT Progress Note     Underlined portions in a dimension indicate important information carried forward from week to week as a reminder.     Treatment Plan Review completed on:  5/30/2019    Attendance Dates:  5/30/2019    Total # of Group Sessions: Phase I: 24 sessions plus service initiation and treatment planning sessions                                                  Phase 2: 15 sessions      Phase 3:    1    Length of stay/projected discharge date: 6/13/2019 MONDAY TUESDAY WEDNESDAY THURSDAY FRIDAY SATURDAY SUNDAY Total   Group Therapy           Specialty Groups*           1:1           Family Program           [place]             Phase III      2 hours    2 hours   Absent           Total    2 hours    2 hours     *Specialty Groups include Mental Health Care, Assertiveness and Communication, Sobriety Maintenance Skills, Spiritual Care, Stress Management, Relapse Prevention, Family Systems.                    Learning Style:  by reading    Staff member contributing: BUSTER Best, JANINA     Received supervision:  Staffed with PO Barnett      Client: contributed to goals and plan    Did Client receive a copy of treatment plan/revised plan: Yes; signed 2/6/2019    Changes to Treatment Plan: No    Client agrees with plan/revised plan: Yes    Any changes in Vulnerable Adult Status?  No   (If yes, add to treatment plan and individual abuse prevention plan).    Substance Use Disorders:    Opioid Use Disorder, Severe   (F11.20) (304.00) in early remission  Sedative, Hypnotic, or Anxiolytic Use Disorder, Severe  (F13.20) (304.10) in early remission  Stimulant Related Disorder, Severe   (F15.20) (304.40) Amphetamine type substance, in early remission  Tobacco Use Disorder, Moderate   (F17.200) (305.1)    1) Care Coordination Activities:  conferred with ZHANNA Ely LICSW  2) Medical, Mental Health and other appointments the client attended:  Psychotherapy/psychiatry scheduled next week   3)  Medication issues: suboxone maintenance 20 mg/daily  4) Physical and mental health problems: See dimensions 2 and 3 below for further details.  5) Review and evaluation of the individual abuse prevention plan: NA    ASAM Risk Ratings and Data       DIMENSION 1: Acute Intoxication/Withdrawal  The client's ability to cope with withdrawal symptoms and current state of intoxication       Acute Intoxication/Withdrawal - Current Risk Factor:  1    Reporting sober date of 10/25/2018 opioids, benzos, stimulants; 3/26/2019 alcohol    Goals:  Develop effective strategies to maintain sobriety.  Continue Suboxone maintenance.  Report to counselor and group any alcohol or substance use.     Data: Client reports last use of heroin, methamphetamine and benzodiazapine as 10/24/2018.  She is on Suboxone maintenance of 20 mg daily.    3/26/19:  Client reports last use of alcohol as 3/25/2019.    On Thursday client exhibits no signs of intoxication or withdrawa.  Client denies any current symptoms of withdrawal or PAWS.    DIMENSION 2:  Biomedical Conditions and Complaints  The degree to which any physical disorder would interfere with treatment for substance abuse and the client's ability to tolerate any related discomfort     Biomedical Conditions and Complaints - Current Risk Factor:  0    Goals:  Obtain a neurological examination.  Disclose CD status to medical providers and follow up with medical interventions while in IOP.  Maintain good physical health.    Data: Client denies chronic biomedical conditions which would interfere with treatment.  She reports medical conditions of hypothyroidism, acid reflux, allergy to bees, and periodic limited and minor seizure episodes without loss of consciousness.  Client reports having a PCP and sees an addiction specialist monthly.  She reports medication compliance with Levothyroxine, Famotidine, and Suboxone. She takes 2.5 8mg strips of Suboxone per day and has since 2013.     On Thursday  client denied any new or worsening physical problems.  She  denies any current biomedical appointments.  She reports meeting her exercise goal this week.    DIMENSION 3:  Emotional/Behavioral/Cognitive Conditions and Complications  The degree to which any condition or complications are likely to interfere with treatment for substance abuse or with function in significant life areas and the likelihood of risk of harm to self or others.     Emotional/Behavioral - Current Risk Factor:  2    DSM-5 Diagnoses:   Client reports feelings of anxiety, depression, trauma and grief and loss.  Therapist will do a diagnostic interview to update information     Suicide Assessment: very low risk    Goals: Learn how to apply self-care and psychotherapy to build a repertoire of daily habits that counteract PAWS, fatigue, depression and anxiety leading to increased resiliency and well-being.  Schedule a mental health diagnostic assessment.  Understand the relationship between mental health concerns and addiction.     Data: Client reports mental health concerns of anxiety, depression, trauma and grief and loss.  Client denies current or past mental health diagnoses or psychotropic medications.  EMR indicates past diagnosis of anxiety and historical medication with Lexapro.  On 2/04/2019, client s PHQ-9 score was 3 out of 27, indicating minimal depression, and ZAID-7 score was 4 out of 21, indicating minimal anxiety.  Client's protective factors include:       Having people in his/her life that would prevent the patient from considering a suicide attempt (i.e. young children, spouse, parents, etc.)     Having easy access to supportive family members.  5/13/19:  Client has begun to be more punctual in attendance in group.  Time management discussed as a stress reliever.  Client agreed that allowing more time made her feel more relaxed.    On Thursday client rates her depression, anxiety, or other mental health concerns as 2 of 10 (10  "highest) due to \"time management\".  Client denies any thoughts of hurting herself or others.  She reports feeling grateful for good weather and seeing old friends in Phase 3, and also feels healthy.      DIMENSION 4:  Readiness to Change  Consider the amount of support and encouragement necessary to keep the client involved in treatment.     Readiness to Change - Current Risk Factor:  0    Goals:  Understand the impact your substance use has had on you, your family, and significant relationships.  Increase internal motivation to change.    Data: On Thursday client rates her motivation for recovery as 10 of 10 (10 highest).  Client identified 3 reasons she wants to remain sober.       DIMENSION 5:  Relapse/Continued Use/Continued Problem Potential  Consider the degree to which the client recognizes relapse issues and has the skills to prevent relapse of either substance use or mental health problems.     Relapse/Continued Use/Continued Problem Potential - Current Risk Factor:  1    Goals:  Identify and understand personal relapse and self-sabotage patterns.  Identify personal triggers and relapse warning signs.  Develop sober coping and living skills in order to address the development of a strategy for long term recovery.    Data: Client reports approximately 15 detox admissions and 7 prior CD treatment experiences with 6 completed.  She reports a 2-year period of sobriety in the past followed by two 1-year periods.  Client identifies a pattern of having a year of sobriety and relapsing for a month.  She reports she has been unable to attain sobriety on her own.    3/11/19:  Client stated benzo's are \"fluttering in the back of my mind when I am super anxious.\"     On Thursday client rates her strongest craving to use in the past week as 0 of 10 (10 highest).  Client states \"agreed to start Decision Points (like REBT), started reading/listening to Co-dependent No More\" as some things she did to change her thinking " "and behaviors for the sake of her sobriety.  She finds it \"extremely fitting\".  She identifies personal strengths of caring and willing to try new things..  She reports using coping skill of going to a new meeting.      DIMENSION 6:  Recovery Environment  Consider the degree to which key areas of the client's life are supportive of or antagonistic to treatment participation and recovery.     Recovery Environment - Current Risk Factor:  1    Support group attended this week:  Yes    Did family agree to attend family week:  yes    If yes: completed 2/25/19 and 3/4/19.    Goals:  Develop a sober support network.  Increase sober support network. Identify and attend sober support group meetings.  Heal relationship with boyfriend.    Data: Client is unemployed but seeking work.  Client lives with her non-using boyfriend who she describes as supportive.      On Thursday client reports attendance at 2 sober support meetings in the past week.  Client reports having a sponsor.  Client states \"moving forward with my steps with my sponsor and attending meetings with regularity\".    Client participated in sober activities.  D-client reports finding sober support meetings valuable because she is meeting \"strong, stable, healthy women\".  I-supportive listening, affirmation  A-client is becoming more able to cope with life stressors with support she is finding  P-continue attending meetings and working with sponsor.           Intervention:   Behavioral Therapy, Cognitive Behavioral Therapy, Counselor Feedback, Education, Emotional management, Group Feedback, Motivational Interviewing, Relapse Prevention, Twelve Step Facilitation, REBT    This week we discussed motivation to change, relapse prevention and recovery planning.  Client participated with the check- in process.  Client participated in a discussion of the importance of sober support groups, and how to find a good one as well as a productive relationship with a sponsor.  " Recovery Planning was addressed through group peers  presentations of portions of their  Recovery Care Plan  assignments with discussion.  Client participated, provided supportive feedback, and spoke about how she related to recovery goals and skills as described in peers  assignments.  Client participated in self-soothing exercises.  Client participated in a guided imagery meditation and compared it to breathing meditations done in previous groups.  Client expressed she found it to be a useful alternative meditation technique.  Client expressed something she was grateful for.  Client affirmed and supported for beginning Phase 3.    Assessment:  Stages of Change Model  Contemplation    Plan:   Client will begin work on her Recovery Care Plan  Have regular punctual attendance.  Practice awareness of current state of mind.  Prepare to present remaining assignments.  Practice meditation techniques and share reflections with group.    BUSTER Best, LADC

## 2019-06-06 ENCOUNTER — HOSPITAL ENCOUNTER (OUTPATIENT)
Dept: BEHAVIORAL HEALTH | Facility: CLINIC | Age: 29
End: 2019-06-06
Attending: SOCIAL WORKER
Payer: COMMERCIAL

## 2019-06-08 NOTE — PROGRESS NOTES
Jennifer GREY Poyntelle  [id number]               Adult CD Progress Note and Treatment Plan Review     Attendance       Thursday    Group Date: 6/6/2019   Group Attendance Excused from group session   Group Therapy Type Addiction and Psychoeducation   Group Topic Covered Coping Skills/Lifestyle Management, Distress Tolerance and Mindfulness   Client's Response To Topic Other - Client did not attend..   Client Participation Detail Other - Client did not attend.   Attendance Other - Client did not attend.   Individual Attendance None   Family Attendance None   Other Comments/Information None     Total # of Phase 1 Group Sessions: 24 sessions plus service initiation and treatment planning sessions.     Total # of Phase 2 Group Sessions: 15 sessions  Total # of Phase 3 Group Sessions: 1      Support group attended this week: no    Reporting sobriety: Yes    Treatment Plan Review     Treatment Plan Review completed on:  6/6/2019     Projected discharge date: 6/13/2019    Client preferred learning style: Reading    Staff member(s) contributing: Sharon Bryant MFA, MA, JANINAC    Received supervision: No.    Client involvement with treatment planning: contributed to goals and plan.    Client received copy of plan/revised plan: Yes    Client agrees with plan/revised plan: Yes    Changes to Treatment Plan: No    Client's Dimension 1 Goal(s) Develop effective strategies to maintain sobriety.  Continue Suboxone maintenance.  Report to counselor and group any alcohol or substance use.    Goal not addressed this week.   Client's Dimension 2 Goal(s) Obtain a neurological examination.  Disclose CD status to medical providers and follow up with medical interventions while in IOP.  Maintain good physical health. Goal not addressed this week.   Client's Dimension 3 Goal(s) Learn how to apply self-care and psychotherapy to build a repertoire of daily habits that counteract PAWS, fatigue, depression and anxiety leading to increased  resiliency and well-being.  Schedule a mental health diagnostic assessment.  Understand the relationship between mental health concerns and addiction. Goal not addressed this week.   Client's Dimension 4 Goal(s) Understand the impact your substance use has had on you, your family, and significant relationships.  Increase internal motivation to change.    Goal not addressed this week.   Client's Dimension 5 Goal(s) Identify and understand personal relapse and self-sabotage patterns.  Identify personal triggers and relapse warning signs.  Develop sober coping and living skills in order to address the development of a strategy for long term recovery. Goal not addressed this week.   Client's Dimension 6 Goal(s) Develop a sober support network.  Increase sober support network. Identify and attend sober support group meetings.  Heal relationship with boyfriend. Goal not addressed this week.     New Goals added since last review: None.    Goal(s) worked on since last review: Client did not attend session during this review period.    Strategies effective: Yes    Care Coordination Activities: None.    Medical, Mental Health and other appointments the client attended: None.    Medication issues: None.    Physical and mental health problems: Yes - Client recommended to complete neurological exam..    Review and evaluation of the individual abuse prevention plan: The program's individual abuse prevention plan (IAPP) is sufficient for this client.     Substance Use Disorders:    Opioid Use Disorder, Severe   (F11.20) (304.00) in early remission  Sedative, Hypnotic, or Anxiolytic Use Disorder, Severe  (F13.20) (304.10) in early remission  Stimulant Related Disorder, Severe   (F15.20) (304.40) Amphetamine type substance, in early remission  Tobacco Use Disorder, Moderate   (F17.200) (305.1)    ASAM Risk Rating: (Note the rationale for risk rating changes)    Dimension 1 - 1    Dimension 2 - 0    Dimension 3 - 2    Dimension 4 -  0    Dimension 5 - 1    Dimension 6 - 1  Data: (Need to include short narrative for the week on what was worked on)  Client did not attend session.      Intervention:   Client did not attend session.    Assessment:    Stages of Change Model  Contemplation    Appears/Sounds:  Ambivalent    Plan:   Client will continue with plan, as noted during previous review:  Client will begin work on her Recovery Care Plan  Have regular punctual attendance.  Practice awareness of current state of mind.  Prepare to present remaining assignments.  Practice meditation techniques and share reflections with group      Sharon Bryant MFA, MA, LADC

## 2019-06-13 ENCOUNTER — HOSPITAL ENCOUNTER (OUTPATIENT)
Dept: BEHAVIORAL HEALTH | Facility: CLINIC | Age: 29
End: 2019-06-13
Attending: SOCIAL WORKER
Payer: COMMERCIAL

## 2019-06-13 PROCEDURE — H2035 A/D TX PROGRAM, PER HOUR: HCPCS | Mod: HQ

## 2019-06-13 NOTE — PROGRESS NOTES
Jennifer GREY Marlin  [id number]               Adult CD Progress Note and Treatment Plan Review     Attendance       Thursday    Group Date: 6/13/2019   Group Attendance Attended group session   Group Therapy Type Addiction   Group Topic Covered Coping Skills/Lifestyle Management and Relapse Prevention   Client's Response To Topic Cooperative with task. Discussed personal experience with topic. Expressed readiness to alter behaviors. Expressed understanding of topic. Listened actively.   Client Participation Detail Highly involved   Attendance 2.0 Hours   Individual Attendance None   Family Attendance None   Other Comments/Information None     Total # of Phase 1 Group Sessions: 24 sessions plus service initiation and treatment planning sessions.     Total # of Phase 2 Group Sessions: 15 sessions  Total # of Phase 3 Group Sessions:   2 sessions      Support group attended this week: yes    Reporting sobriety: Yes    Treatment Plan Review     Treatment Plan Review completed on:  6/13/2019     Projected discharge date: 6/13/2019    Client preferred learning style: Reading    Staff member(s) contributing: BUSTER Best, KYRA    Received supervision: No.    Client involvement with treatment planning: contributed to goals and plan.    Client received copy of plan/revised plan: Yes    Client agrees with plan/revised plan: Yes    Changes to Treatment Plan: No    Client's Dimension 1 Goal(s) Develop effective strategies to maintain sobriety.  Continue Suboxone maintenance.  Report to counselor and group any alcohol or substance use.    See below.   Client's Dimension 2 Goal(s) Obtain a neurological examination.  Disclose CD status to medical providers and follow up with medical interventions while in IOP.  Maintain good physical health. See below.   Client's Dimension 3 Goal(s) Learn how to apply self-care and psychotherapy to build a repertoire of daily habits that counteract PAWS, fatigue, depression and anxiety leading  to increased resiliency and well-being.  Schedule a mental health diagnostic assessment.  Understand the relationship between mental health concerns and addiction. See below.   Client's Dimension 4 Goal(s) Understand the impact your substance use has had on you, your family, and significant relationships.  Increase internal motivation to change.    See below.   Client's Dimension 5 Goal(s) Identify and understand personal relapse and self-sabotage patterns.  Identify personal triggers and relapse warning signs.  Develop sober coping and living skills in order to address the development of a strategy for long term recovery. See below.   Client's Dimension 6 Goal(s) Develop a sober support network.  Increase sober support network. Identify and attend sober support group meetings.  Heal relationship with boyfriend. See below.     New Goals added since last review: None.    Goal(s) worked on since last review: Client did not attend session during this review period.    Strategies effective: Yes    Care Coordination Activities: None.    Medical, Mental Health and other appointments the client attended: psychotherapy scheduled 6/14/19    Medication issues: client is on Suboxone maintenance    Physical and mental health problems: Yes - Client recommended to complete neurological exam..    Review and evaluation of the individual abuse prevention plan: The program's individual abuse prevention plan (IAPP) is sufficient for this client.     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  She  denies any current biomedical appointments.  She reports meeting her exercise goal this week.    Dimension 3 - 2  On Thursday client rates her depression, anxiety, or other mental health concerns as 2 of 10 (10 highest) due to \"moving again and the stress therein\".  Client denies any thoughts of hurting herself or others.     Dimension 4 - 0  On Thursday client rates her motivation for recovery as 10 of 10 (10 highest).  Client identified 3 reasons she wants to remain sober.      Dimension 5 - 1  On Thursday client rates her strongest craving to use in the past week as 0 of 10 (10 highest).  Client states \"reading Co-dependant No More\" as something she did to change her thinking and behaviors for the sake of her sobriety.  She identifies personal strengths of open communicator.  She reports using coping skill of trying new things and meetings.     Dimension 6 - 1  On Thursday client reports attendance at 3 sober support meetings in the past week.  Client reports having a sponsor.  Client states \"things are great at work and home. I love attending my support groups and the people there are great\".    Client participated in sober activities.    Data: (Need to include short narrative for the week on what was worked on)  This week we discussed motivation to change, relapse prevention and recovery planning.  Client participated with the check- in process.  Client participated in a discussion of sober support groups and identified a home group.  Recovery Planning was addressed through group peer s presentation of a portion of his  Recovery Care Plan  assignment with discussion.  Client participated, provided supportive feedback, and spoke about how she related to recovery goals and skills as described in peers  assignments.  Client also presented her  Triggers and Cues  assignment with discussion.  Client spoke about " emotional and physical cues she had experienced.  Client participated in self-soothing exercises.  Client practiced 4x4 and natural mindful breathing.  Client expressed she found it to be helpful.  Client expressed something she was grateful for.  Client affirmed and supported for her honest and thorough work on her assignment.    Intervention:   Behavioral Therapy, Cognitive Behavioral Therapy, Counselor Feedback, Education, Emotional management, Group Feedback, Motivational Interviewing, Relapse Prevention, Twelve Step Facilitation, REBT    Assessment:    Stages of Change Model  Contemplation    Appears/Sounds:  Cooperative  Motivated    Plan:   Client will begin work on her Recovery Care Plan  Have regular punctual attendance.  Practice awareness of current state of mind.  Prepare to present remaining assignments.  Practice meditation techniques and share reflections with group      BUSTER Best, LADC

## 2019-06-20 ENCOUNTER — HOSPITAL ENCOUNTER (OUTPATIENT)
Dept: BEHAVIORAL HEALTH | Facility: CLINIC | Age: 29
End: 2019-06-20
Attending: SOCIAL WORKER
Payer: COMMERCIAL

## 2019-06-20 PROCEDURE — H2035 A/D TX PROGRAM, PER HOUR: HCPCS | Mod: HQ

## 2019-06-21 NOTE — PROGRESS NOTES
Jennifer Tineoenter  [id number]               Adult CD Progress Note and Treatment Plan Review     Attendance       Thursday    Group Date: 6/20/2019   Group Attendance Attended group session   Group Therapy Type Addiction   Group Topic Covered Coping Skills/Lifestyle Management, Goal Setting and Relapse Prevention   Client's Response To Topic Cooperative with task. Discussed personal experience with topic. Expressed readiness to alter behaviors. Expressed understanding of topic. Listened actively.   Client Participation Detail Highly involved   Attendance 2.0 Hours   Individual Attendance None   Family Attendance None   Other Comments/Information None     Total # of Phase 1 Group Sessions: 24 sessions plus service initiation and treatment planning sessions.     Total # of Phase 2 Group Sessions: 15 sessions  Total # of Phase 3 Group Sessions:   3 sessions      Support group attended this week: yes    Reporting sobriety: Yes    Treatment Plan Review     Treatment Plan Review completed on:  6/20/2019     Projected discharge date: 8/1/2019    Client preferred learning style: Reading    Staff member(s) contributing: BUSTER Best, KYRA    Received supervision: No.    Client involvement with treatment planning: contributed to goals and plan.    Client received copy of plan/revised plan: Yes    Client agrees with plan/revised plan: Yes    Changes to Treatment Plan: No    Client's Dimension 1 Goal(s) Develop effective strategies to maintain sobriety.  Continue Suboxone maintenance.  Report to counselor and group any alcohol or substance use.    See below.   Client's Dimension 2 Goal(s) Obtain a neurological examination.  Disclose CD status to medical providers and follow up with medical interventions while in IOP.  Maintain good physical health. See below.   Client's Dimension 3 Goal(s) Learn how to apply self-care and psychotherapy to build a repertoire of daily habits that counteract PAWS, fatigue, depression and  anxiety leading to increased resiliency and well-being.  Schedule a mental health diagnostic assessment.  Understand the relationship between mental health concerns and addiction. See below.   Client's Dimension 4 Goal(s) Understand the impact your substance use has had on you, your family, and significant relationships.  Increase internal motivation to change.    See below.   Client's Dimension 5 Goal(s) Identify and understand personal relapse and self-sabotage patterns.  Identify personal triggers and relapse warning signs.  Develop sober coping and living skills in order to address the development of a strategy for long term recovery. See below.   Client's Dimension 6 Goal(s) Develop a sober support network.  Increase sober support network. Identify and attend sober support group meetings.  Heal relationship with boyfriend. See below.     New Goals added since last review: None.    Goal(s) worked on since last review: Client did not attend session during this review period.    Strategies effective: Yes    Care Coordination Activities: None.    Medical, Mental Health and other appointments the client attended: psychotherapy 6/18/19     Medication issues: client is on Suboxone maintenance    Physical and mental health problems: Yes - Client recommended to complete neurological exam.  Review and evaluation of the individual abuse prevention plan: The program's individual abuse prevention plan (IAPP) is sufficient for this client.     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  Client is on Suboxone maintenance    Dimension 2 - 0  On Thursday client denied any new or worsening physical problems.  She  denies any current biomedical appointments.  She reports meeting her exercise goal this week.    Dimension 3 - 2  On Thursday client rates her depression, anxiety, or other mental health concerns as 3 of 10 (10 highest) due to \"needing to find a new place\".  Client denies any thoughts of hurting herself or others.  Client reports having found a new psychotherapist who she likes.  Client attended 6/18/19 at [facility].    Dimension 4 - 0  On Thursday client rates her motivation for recovery as 10 of 10 (10 highest).  Client identified 3 reasons she wants to remain sober.      Dimension 5 - 1  On Thursday client rates her strongest craving to use in the past week as 0 of 10 (10 highest).  Client states \"reading Co-dependant No More\" as something she did to change her thinking and behaviors for the sake of her sobriety.  She identifies personal strengths of open communicator.  She reports using coping skill of trying new things and meetings.     Dimension 6 - 1  On Thursday client reports attendance at 2 sober support meetings in the past week.  Client reports having a sponsor.  Client states things are \"good\" at home, work, and with her outside support groups.  Client participated in sober activities.    Data: (Need to include short narrative for the week on what was worked on)  This week we discussed motivation to change, relapse prevention and recovery planning.  Client participated with the check- in process.  Recovery Planning was addressed through group peers  presentations of final portions of their  Recovery Care Plan  assignments with discussion.  Client participated, provided supportive feedback, and spoke about how she related to recovery goals and skills as described in peers  assignments.  Client participated in self-soothing exercises.  Client " expressed something she was grateful for.      Intervention:   Behavioral Therapy, Cognitive Behavioral Therapy, Counselor Feedback, Education, Emotional management, Group Feedback, Motivational Interviewing, Relapse Prevention, Twelve Step Facilitation, REBT    Assessment:    Stages of Change Model  Contemplation    Appears/Sounds:  Cooperative  Motivated    Plan:   Client will prepare to present her Recovery Care Plan  Have regular punctual attendance.  Practice awareness of current state of mind.  Prepare to present remaining assignments.  Practice meditation techniques and share reflections with group      BUSTER Best, LADC

## 2019-06-27 ENCOUNTER — HOSPITAL ENCOUNTER (OUTPATIENT)
Dept: BEHAVIORAL HEALTH | Facility: CLINIC | Age: 29
End: 2019-06-27
Attending: SOCIAL WORKER
Payer: COMMERCIAL

## 2019-06-27 PROCEDURE — H2035 A/D TX PROGRAM, PER HOUR: HCPCS | Mod: HQ

## 2019-06-27 NOTE — PROGRESS NOTES
Jennifer Stanford  [id number]               Adult CD Progress Note and Treatment Plan Review     Attendance       Thursday    Group Date: 6/27/2019   Group Attendance Attended group session   Group Therapy Type Addiction   Group Topic Covered Coping Skills/Lifestyle Management, Goal Setting and Relapse Prevention   Client's Response To Topic Cooperative with task. Discussed personal experience with topic. Expressed readiness to alter behaviors. Expressed understanding of topic. Listened actively.   Client Participation Detail Highly involved   Attendance 2.0 Hours   Individual Attendance None   Family Attendance None   Other Comments/Information None     Total # of Phase 1 Group Sessions: 24 sessions plus service initiation and treatment planning sessions.     Total # of Phase 2 Group Sessions: 15 sessions  Total # of Phase 3 Group Sessions:   4 sessions      Support group attended this week: yes    Reporting sobriety: Yes, client reports last use date of date of heroin, methamphetamine and benzodiazapine as 10/24/2018 and last use date of alcohol as  3/25/2019.    Treatment Plan Review     Treatment Plan Review completed on:  6/27/2019     Projected discharge date: 8/1/2019    Client preferred learning style: Reading    Staff member(s) contributing: BUSTER Best, [facility]    Received supervision: No.    Client involvement with treatment planning: contributed to goals and plan.    Client received copy of plan/revised plan: Yes    Client agrees with plan/revised plan: Yes    Changes to Treatment Plan: No    Client's Dimension 1 Goal(s) Develop effective strategies to maintain sobriety.  Continue Suboxone maintenance.  Report to counselor and group any alcohol or substance use.    See below.   Client's Dimension 2 Goal(s) Obtain a neurological examination.  Disclose CD status to medical providers and follow up with medical interventions while in IOP.  Maintain good physical health. See below.   Client's Dimension 3  Goal(s) Learn how to apply self-care and psychotherapy to build a repertoire of daily habits that counteract PAWS, fatigue, depression and anxiety leading to increased resiliency and well-being.  Schedule a mental health diagnostic assessment.  Understand the relationship between mental health concerns and addiction. See below.   Client's Dimension 4 Goal(s) Understand the impact your substance use has had on you, your family, and significant relationships.  Increase internal motivation to change.    See below.   Client's Dimension 5 Goal(s) Identify and understand personal relapse and self-sabotage patterns.  Identify personal triggers and relapse warning signs.  Develop sober coping and living skills in order to address the development of a strategy for long term recovery. See below.   Client's Dimension 6 Goal(s) Develop a sober support network.  Increase sober support network. Identify and attend sober support group meetings.  Heal relationship with boyfriend. Client presented assignment.     New Goals added since last review: None.    Goal(s) worked on since last review: dim 5, 6    Strategies effective: Yes    Care Coordination Activities: None.    Medical, Mental Health and other appointments the client attended: psychotherapy 6/25/19     Medication issues: client is on Suboxone maintenance    Physical and mental health problems: Yes - Client recommended to complete neurological exam.  Review and evaluation of the individual abuse prevention plan: The program's individual abuse prevention plan (IAPP) is sufficient for this client.     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  Client is on Suboxone maintenance    Dimension 2 - 0  On Thursday client denied any new or worsening physical problems.  She  denies any current biomedical appointments.  She reports meeting her exercise goal this week.    Dimension 3 - 2  On Thursday client rates her depression, anxiety, or other mental health concerns as 0 of 10 (10 highest) due to \"calm, cool, collected\".  Client denies any thoughts of hurting herself or others.  Client reports having found a new psychotherapist who she likes.  [facility] 4 - 0  On Thursday client rates her motivation for recovery as 10 of 10 (10 highest).  Client identified 3 reasons she wants to remain sober.      Dimension 5 - 1  On Thursday client rates her strongest craving to use in the past week as 0 of 10 (10 highest).  Client states \"continue reaching my goals and requirements\" as something she did to change her thinking and behaviors for the sake of her sobriety.  She identifies personal strength of perseverance.  She reports using coping skill of processing thoughts and feelings with supportive people.     Dimension 6 - 1  On Thursday client reports attendance at 2 sober support meetings in the past week.  Client reports having a sponsor.  Client states things are \"good, gettting ready to move\" at home, work, and with her outside support groups.  Client participated in sober activities.    Data: (Need to include short narrative for the week on what was worked on)  This week we discussed motivation to change, relapse prevention and recovery planning.  Client participated with the check- in process and was supportive of her group peer who had lost custody of her children last week.  Recovery Planning was addressed through client's presentation of the first page of her  Recovery Care Plan  " assignment with discussion.  Client spoke about recovery goals of strengthening her spiritual life, obtaining financial security, and having positive relationships.  She spoke about ways she could recognize her successes and what warning signs would show her she was getting off-track.  Client participated in an art therapy exercise to contrast how she projected herself to the outer world while using to how she felt about herself inside.  Client expressed that her inside self and outside self are much more synchronized in recovery.  Client participated in self-soothing exercises.  Client practiced 4x4 and natural mindful breathing.  Client expressed something she was grateful for.      Intervention:   Behavioral Therapy, Cognitive Behavioral Therapy, Counselor Feedback, Education, Emotional management, Group Feedback, Motivational Interviewing, Relapse Prevention, Twelve Step Facilitation, REBT, art therapy    Assessment:    Stages of Change Model  Contemplation    Appears/Sounds:  Cooperative  Motivated    Plan:   Client will continue attending Phase 3 and complete next page of RCA.  Have regular punctual attendance.  Practice awareness of current state of mind.  Practice meditation techniques daily.      Edson Lopez MPS, [facility]

## 2019-07-03 NOTE — ADDENDUM NOTE
Encounter addended by: Edson Lopez, [facility] on: 7/2/2019 8:12 PM   Actions taken: Sign clinical note

## 2019-07-03 NOTE — PROGRESS NOTES
Client is not scheduled for Phase 3 group 7/4/2019 as clinic is closed for holiday.  Client cannot be assessed this week.    KYRA Best

## 2019-07-18 ENCOUNTER — HOSPITAL ENCOUNTER (OUTPATIENT)
Dept: BEHAVIORAL HEALTH | Facility: CLINIC | Age: 29
End: 2019-07-18
Attending: SOCIAL WORKER
Payer: COMMERCIAL

## 2019-07-18 PROCEDURE — H2035 A/D TX PROGRAM, PER HOUR: HCPCS | Mod: HQ

## 2019-07-19 NOTE — PROGRESS NOTES
Jennifer Stanford  [id number]               Adult CD Progress Note and Treatment Plan Review     Attendance       Thursday    Group Date: 7/18/2019   Group Attendance Attended group session   Group Therapy Type Addiction   Group Topic Covered Coping Skills/Lifestyle Management, Goal Setting, Relapse Prevention and sober support groups   Client's Response To Topic Cooperative with task. Discussed personal experience with topic. Expressed readiness to alter behaviors. Listened actively. Offered helpful suggestions to peers.   Client Participation Detail Highly involved   Attendance 2.0 Hours   Individual Attendance None   Family Attendance None   Other Comments/Information None     Total # of Phase 1 Group Sessions: 24 sessions plus service initiation and treatment planning sessions.     Total # of Phase 2 Group Sessions: 15 sessions  Total # of Phase 3 Group Sessions:   5 sessions      Support group attended this week: yes    Reporting sobriety: Yes, client reports last use date of date of heroin, methamphetamine and benzodiazapine as 10/24/2018 and last use date of alcohol as  3/25/2019.    Treatment Plan Review     Treatment Plan Review completed on:  7/19/2019     Projected discharge date: 8/1/2019    Client preferred learning style: Reading    Staff member(s) contributing: BUSTER Best, KYRA    Received supervision: No.    Client involvement with treatment planning: contributed to goals and plan.    Client received copy of plan/revised plan: Yes    Client agrees with plan/revised plan: Yes    Changes to Treatment Plan: No    Client's Dimension 1 Goal(s) Develop effective strategies to maintain sobriety.  Continue Suboxone maintenance.  Report to counselor and group any alcohol or substance use.    See below.   Client's Dimension 2 Goal(s) Obtain a neurological examination.  Disclose CD status to medical providers and follow up with medical interventions while in IOP.  Maintain good physical health. See  below.   Client's Dimension 3 Goal(s) Learn how to apply self-care and psychotherapy to build a repertoire of daily habits that counteract PAWS, fatigue, depression and anxiety leading to increased resiliency and well-being.  Schedule a mental health diagnostic assessment.  Understand the relationship between mental health concerns and addiction. See below.   Client's Dimension 4 Goal(s) Understand the impact your substance use has had on you, your family, and significant relationships.  Increase internal motivation to change.    See below.   Client's Dimension 5 Goal(s) Identify and understand personal relapse and self-sabotage patterns.  Identify personal triggers and relapse warning signs.  Develop sober coping and living skills in order to address the development of a strategy for long term recovery. Client presented assignment.   Client's Dimension 6 Goal(s) Develop a sober support network.  Increase sober support network. Identify and attend sober support group meetings.  Heal relationship with boyfriend. See below.     New Goals added since last review: None.    Goal(s) worked on since last review: dim 5, 6    Strategies effective: Yes    Care Coordination Activities: discussed how to find a sponsor    Medical, Mental Health and other appointments the client attended: psychotherapy 7/18/19     Medication issues: client is on Suboxone maintenance    Physical and mental health problems: Yes - Client recommended to complete neurological exam.  Review and evaluation of the individual abuse prevention plan: The program's individual abuse prevention plan (IAPP) is sufficient for this client.     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  Client is on Suboxone maintenance    Dimension 2 - 0  On Thursday client denied any new or worsening physical problems.  She  denies any current biomedical appointments.  She reports meeting her exercise goal this week.    Dimension 3 - 2  On Thursday client rates her depression, anxiety, or other mental health concerns as 0 of 10 (10 highest) due to \"feeling calm, collected\".  Client denies any thoughts of hurting herself or others.  Client reports attending session with psychotherapist.  Client attended 7/18/19 at [facility].    Dimension 4 - 0  On Thursday client rates her motivation for recovery as 10 of 10 (10 highest).  Client identified 3 reasons she wants to remain sober.      Dimension 5 - 1  On Thursday client rates her strongest craving to use in the past week as 0 of 10 (10 highest).  Client states \"go to therapy and create a plan for healing and changing\" as something she did to change her thinking and behaviors for the sake of her sobriety.  She identifies personal strength of perseverance.  She reports using coping skill of practicing extroversion.     Dimension 6 - 1  On Thursday client reports attendance at 3 sober support meetings in the past week.  Client reports having a sponsor but is looking for a new one.  Client states things are \"good\" at home, work, and with her outside support groups.  Client participated in sober activities.    Data: (Need to include short narrative for the week on what was worked on)  This week we discussed motivation to change, relapse prevention and recovery planning.  Client participated with the check- in process.  Recovery Planning was addressed through client's presentation of the second page of her  Recovery Care Plan  assignment with discussion.  Client spoke about daily, " weekly and monthly structured plans to help recovery.  Group peers' presented the first portions of their Recovery Care Plans.  Client participated, provided supportive feedback, and spoke about how she related to recovery goals and skills as described in peer's assignments.    Client participated in a discussion of sober support groups and how to find a sponsor.  Client gave helpful feedback to peers.  Client participated in self-soothing exercises.  Client practiced 4x4 and natural mindful breathing.  Client expressed something she was grateful for.    Intervention:   Behavioral Therapy, Cognitive Behavioral Therapy, Counselor Feedback, Education, Emotional management, Group Feedback, Motivational Interviewing, Relapse Prevention, Twelve Step Facilitation, REBT, art therapy    Assessment:    Stages of Change Model  Contemplation    Appears/Sounds:  Cooperative  Motivated    Plan:   Client will continue attending Phase 3 and complete next page of RCA.  Have regular punctual attendance.  Practice awareness of current state of mind.  Practice meditation techniques daily.      BUSTER Best, [facility]

## 2019-08-01 ENCOUNTER — HOSPITAL ENCOUNTER (OUTPATIENT)
Dept: BEHAVIORAL HEALTH | Facility: CLINIC | Age: 29
End: 2019-08-01
Attending: SOCIAL WORKER

## 2019-08-01 PROCEDURE — H2035 A/D TX PROGRAM, PER HOUR: HCPCS | Mod: HQ

## 2019-08-05 NOTE — ADDENDUM NOTE
Encounter addended by: Edson Lopez [facility] on: 8/5/2019 11:19 AM   Actions taken: Sign clinical note

## 2019-08-05 NOTE — PROGRESS NOTES
Re:   to Edson SANTANA at [facility] re: pt's insurance term'd  8/2/2019 per mn-its online are ma/natalyp policy terminated 7/31/2019  pmi# [id number]    Spoke with client today who said she had [facility] all [facility] before her ins lapsed, but she will call them right away to follow-up.  I advised her not to come to group this Thursday unless ins is reinstated and to keep me informed of what she is [facility].

## 2019-08-08 NOTE — PROGRESS NOTES
Client was absent in group on 8/8/2019 and could not be assessed. This is due to insurance issues.  See previous note.    KYRA Best

## 2019-08-08 NOTE — ADDENDUM NOTE
Encounter addended by: Edson Lopez, [facility] on: 8/8/2019 12:08 PM   Actions taken: Pend clinical note

## 2019-09-19 NOTE — PROGRESS NOTES
[facility] Discharge Summary/Instructions     Patient: Jennifer Stanford  MRN:  [id number]  :  1990  Age: 29 year old Sex:  female    Focus of Treatment / Discharge Recommendations  Personal Safety/ Management of Symptoms    * Follow your safety plan.  Report increased symptoms to your care team and /or go to the nearest Emergency Department.    * Call crisis lines as needed    [facility] [phone]                [facility] [phone]  [facility] [phone]              Crisis Connection [phone]  [facility] [phone]              [facility] [phone]  [facility] [phone]          National Suicide Prevention [phone]  [facility] [phone]            Suicide Prevention [phone]  [facility] [phone]    Abstinence/Relapse Prevention  * Take all medicines as directed.  Carry a current list of medicines with you.  * Use coping skills: accepting past, identify triggers/cravings, trigger lock, re-frame automatic negative thoughts, mindfulness, deep breathing, meditation, taking breaks, positive affirmations, walking away.  * Do not use illicit (street) drugs, controlled substances (narcotics) or alcohol.    Develop/Improve Independent Living/Socialization Skills:   Maintain open communication with family and friends about sober life.  Maintain healthy relationships.  Respect your own and others  boundaries.  Attend recovery support groups regularly.  Maintain contact with your sponsor.  Maintain contact with supportive people in recovery.  Continue to develop and improve independent living skills.  Engage in self-care activities daily.   Maintain consistent employment and housing which are conducive to your recovery.    Community Resources/Supports and Discharge Planning:    Follow up with psychiatrist / main caregiver as necessary.  Follow up with your therapist as scheduled.  Go to group therapy and / or support  groups.  See your medical doctor as needed.      Client Signature:_______________________   Date / Time:___________    Staff Signature:________________________   Date / Time:___________

## 2019-09-25 NOTE — ADDENDUM NOTE
Encounter addended by: Edson Lopez, [facility] on: 9/24/2019 9:35 PM   Actions taken: Clinical Note Signed

## 2019-09-25 NOTE — PROGRESS NOTES
CHEMICAL DEPENDENCY DISCHARGE SUMMARY       EVALUATION COUNSELOR:  KYRA Pascual      TREATMENT COUNSELOR:  BUSTER Best [facility]      REFERRAL SOURCE:  Residential treatment     PROGRAM:  [facility] Adult Intensive Outpatient Chemical Dependency program in [facility].     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      LAST DATE OF USE:  Client reports last use of heroin, methamphetamine and benzodiazepine as 10/24/2018.  Client reports last use of alcohol as 3/25/2019.        HOURS OF TREATMENT COMPLETED:  This client completed 24 sessions of Phase I, 15 sessions of Phase 2, and 6 sessions of Phase 3, plus service initiation, treatment planning, and individual sessions for a total of 100 hours of treatment.      REASON FOR EVALUATION:  Jennifer Stanford had a chemical dependency evaluation on 12/31/2018 by KYRA Pascual, and was referred for treatment.  Client expressed that she wanted to attend [facility] for step-down treatment after completing her residential program.  The plan had already been submitted to a  and she had to follow up.    SERVICES PROVIDED:  The services provided included: treatment and discharge planning, psychoeducation,  recovery skill building, relapse prevention skills, problem solving, managing conflicts and communication skill building, clarifying values, expressing feelings, social skill building, 12-step facilitation, individual psychotherapy and group therapy.     ISSUES ADDRESSED IN TREATMENT:     DIMENSION 1:  Acute Withdrawal Issues and Detox:    Admit risk rating 1, discharge risk rating 0.    Client's goal in this dimension was for client to develop effective strategies to maintain sobriety. Client successfully met this goal and remained abstinent from drugs of disorder throughout treatment.  She did have a use episode with alcohol which is not assessed as a substance of disorder.  She used that episode as a learning experience and committed to abstinence from alcohol as well.  Another goal was to continue suboxone maintenance.  She was initially on suboxone maintenance of 20 mg daily.  By treatment end she has reduced suboxone to 2-4 mg daily.  No concerns in this dimension at time of discharge.       DIMENSION 2:  Biomedical Conditions and Complaints.    Admit risk rating 0, discharge risk rating 0.    Client denied chronic biomedical conditions which would interfere with treatment.  She reported medical conditions of hypothyroidism, acid reflux, allergy to bees, and periodic seizure episodes without loss of consciousness.  Client reports having a PCP and sees an addiction specialist monthly.  She reports medication compliance with Levothyroxine, Famotidine, and Suboxone.  Client s goals were to obtain a neurological examination, disclose CD status to medical providers, follow up with medical interventions while in IOP, and to maintain good physical health.  Client declined to obtain a neurological examination because she had not experienced any seizure-like episodes since 10/2018.  Client attended medical appointments with disclosure and denied any new or worsening physical problems throughout treatment.  Client  regularly met exercise goals.  Client displays full functioning with good ability to cope with physical discomfort and is able to get the services she needs.        DIMENSION 3:  Emotional, Behavioral, Cognitive Conditions and Complications:  Admit risk rating 2, discharge risk rating 1.  Client denies any past mental health issues that had required treatment.  Client denies any history of being prescribed psychotropic medications for mental health issues.  Client's goals in this area were to 1) learn how to apply self-care and psychotherapy to build a repertoire of daily habits that counteract PAWS, fatigue, depression and anxiety leading to increased resiliency and well-being, 2) schedule a mental health diagnostic assessment. and 3) to understand the relationship between mental health concerns and addiction.  Client met these goals by completion of 6 mental health skills groups and related assignments, with participation in group discussions concerning those topics.  Client completed a diagnostic assessment and received diagnoses of 300.02 (F41.1) Generalized Anxiety Disorder 309.81 (F43.10) Posttraumatic Stress Disorder (includes Posttraumatic Stress Disorder for Children 6 Years and Younger)  With delayed expression.  She regularly met with psychotherapist.  At assessment on 12/31/2018 client's PHQ-9 score was 4 out of 27, indicating minimal depression and her ZAID-7 score was 5 out of 21, indicating mild anxiety.  At discharge on 9/19/2019 client's PHQ-9 score was 1 out of 27, indicating minimal depression and her ZAID-7 score was 0 out of 21, indicating minimal anxiety.  She came to group with a positive self-affirmation daily.  Client denied any suicidal or self-injurious ideation, intent or plan throughout treatment.  At treatment end, client s mental health issues appear stable and managed.  She expresses that she continues to see an outside psychotherapist for any mental health issues.      DIMENSION 4:   Readiness to Change:    Admit risk rating 1, discharge risk rating 0.    Client's goals in this dimension were to understand the impact substance use has had on her, her family, and significant relationships, and to increase internal motivation to change.  Client was effective in meeting these goals.  The client completed all treatment strategies in this dimension, including completion of  10 Worst Consequences  and  Symptoms  assignments.  Client expressed 3 reasons she wished to remain sober each week, and had regular and punctual attendance in groups except for excused absences.  She was an active and responsible participant throughout treatment.  Client demonstrates that she is cooperative, motivated, and committed to change.  No concerns in this dimension at time of discharge.      DIMENSION 5:  Relapse, Continued Use, Continued Problem Potential:    Admit risk rating 3, discharge risk rating 0.    Client's goals in this area were for client to identify and understand personal relapse and self-sabotage patterns, to identify personal triggers and relapse warning signs, and to develop sober coping and living skills.  Client was effective in completing all treatment strategies.  Client completed her  Triggers and Cues  and  Self-Sabotage  assignments.  Client appears to have gained insight into herself and her relapse triggers.  She is using strategies to prevent them from causing relapse.  Client completed a Recovery Care Plan and discussed how she will implement it for successful recovery.  No concerns in this dimension at time of discharge.      DIMENSION 6:  Recovery Environment:    Admit risk rating 3, discharge risk rating 1.    Client's goals in this dimension were to resolve legal issues, to heal the relationship with her boyfriend, and to increase her sober support network.  Client was effective in meeting these goals by compliance with legal requirements, completion of Family Group with her boyfriend and  a  Relationship Assessment  assignment, and regularly attending sober support groups.  Client reports having a sponsor.  Client is employed full-time and reports her environment is supportive.  She has set boundaries with her now former boyfriend, although she still resides in the same house as him.  She has continuing legal involvement but appears to be meeting all requirements.      STRENGTHS:  Client was cooperative and actively participated throughout treatment.  Client was able to give good insight and feedback to other members of the group.  Client was able to recognize the harmful consequences of continued use.  Client displays a positive attitude toward the future.  Client identifies compassion, honesty, resiliency and commitment to sobriety as personal strengths.      PROGNOSIS:  This client has a favorable prognosis assuming she follows all continuation of care recommendations.       LIVING ARRANGEMENTS AT DISCHARGE:  Client will live independently in her home with her male roommate.  The environment is reportedly supportive of her sobriety.         CONTINUATION OF CARE RECOMMENDATIONS:    Client is recommended to abstain from all non-prescribed mood-altering substances.    Client is recommended to continue to manage her mental and physical health with her health care providers and follow recommendations made by them.    Client is recommended to take medications as prescribed and continually follow up with her prescribing healthcare providers as directed to do so.    Client is recommended to attend sober support meetings on a regular basis.   Client is recommended to maintain regular contact with her sponsor.   Client is recommended to maintain open communication with family and friends about sober life.  Client is recommended to maintain healthy relationships.  Client is recommended to respect her own and others  boundaries.  Client is recommended to continue to develop and improve independent living  skills.  Client is recommended to maintain contact with supportive people in recovery.  Client is recommended to engage in self-care activities daily.  Client is recommended to remain law abiding and maintain compliance with probation and other legal requirements.       This information has been disclosed to you from records protected by   Federal confidentiality rules (42 CFR part 2). The Federal rules prohibit   you from making any further disclosure of this information unless further   disclosure is expressly permitted by the written consent of the person to   whom it pertains or as otherwise permitted by 42 CFR part 2. A general   authorization for the release of medical or other information is NOT   sufficient for this purpose. The Federal rules restrict any use of the   information to criminally investigate or prosecute any alcohol or drug   abuse patient.    Electronically signed on 09/24/2010 21:35 by  BUSTER Best, [facility]

## 2022-02-17 ENCOUNTER — TELEPHONE (OUTPATIENT)
Dept: BEHAVIORAL HEALTH | Facility: CLINIC | Age: 32
End: 2022-02-17
Payer: COMMERCIAL

## 2022-03-28 ENCOUNTER — HOSPITAL ENCOUNTER (EMERGENCY)
Facility: CLINIC | Age: 32
Discharge: HOME | End: 2022-03-28
Attending: EMERGENCY MEDICINE
Payer: COMMERCIAL

## 2022-03-28 VITALS
DIASTOLIC BLOOD PRESSURE: 89 MMHG | OXYGEN SATURATION: 98 % | RESPIRATION RATE: 16 BRPM | HEART RATE: 110 BPM | SYSTOLIC BLOOD PRESSURE: 139 MMHG

## 2022-03-28 DIAGNOSIS — F11.90 OPIOID USE DISORDER: ICD-10-CM

## 2022-03-28 ASSESSMENT — ENCOUNTER SYMPTOMS
FEVER: 0
COLOR CHANGE: 0
SHORTNESS OF BREATH: 0
NECK STIFFNESS: 0
ARTHRALGIAS: 0
ABDOMINAL PAIN: 0
NERVOUS/ANXIOUS: 1
HEADACHES: 0
DIFFICULTY URINATING: 0
EYE REDNESS: 0
DECREASED CONCENTRATION: 0
CONFUSION: 1
HALLUCINATIONS: 0

## 2022-03-28 NOTE — DISCHARGE INSTRUCTIONS
Please follow up with the [facility] next door, and request referral to Rule 25 resources for pathways to long term recovery.
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
## 2022-03-29 ENCOUNTER — PATIENT OUTREACH (OUTPATIENT)
Dept: CARE COORDINATION | Facility: CLINIC | Age: 32
End: 2022-03-29
Payer: COMMERCIAL

## 2022-03-29 DIAGNOSIS — Z71.89 OTHER SPECIFIED COUNSELING: ICD-10-CM

## 2022-03-29 NOTE — PROGRESS NOTES
Clinic Care Coordination Contact  [facility]/Voicemail       Clinical Data: Care Coordinator Outreach  Outreach attempted x 1. Phone just rings, unable to leave a message.  Plan: CC will attempt to reach patient again in 1-2 business days.    LINDA Klein   Social Work Clinic Care Coordinator   [facility]  PH: [phone]  [email or web address]

## 2022-03-30 NOTE — PROGRESS NOTES
Clinic Care Coordination Contact  [facility]/Voicemail       Clinical Data: Care Coordinator Outreach  Outreach attempted x 2. Unable to leave message as phone just rings, doesn't connect to .  Plan: CC will make no further outreaches at this time.    LINDA Klein   Social Work Clinic Care Coordinator   [facility]  PH: [phone]  [email or web address]

## 2022-04-17 ENCOUNTER — APPOINTMENT (OUTPATIENT)
Dept: CT IMAGING | Facility: CLINIC | Age: 32
End: 2022-04-17
Attending: FAMILY MEDICINE
Payer: COMMERCIAL

## 2022-04-17 ENCOUNTER — HOSPITAL ENCOUNTER (EMERGENCY)
Facility: CLINIC | Age: 32
Discharge: HOME | End: 2022-04-18
Attending: FAMILY MEDICINE | Admitting: FAMILY MEDICINE
Payer: COMMERCIAL

## 2022-04-17 DIAGNOSIS — F15.10 METHAMPHETAMINE ABUSE (H): ICD-10-CM

## 2022-04-17 DIAGNOSIS — F11.10 HEROIN ABUSE (H): ICD-10-CM

## 2022-04-17 DIAGNOSIS — F29 PSYCHOSIS, UNSPECIFIED PSYCHOSIS TYPE (H): ICD-10-CM

## 2022-04-17 LAB
ALBUMIN SERPL-MCNC: 3.8 G/DL (ref 3.4–5)
ALP SERPL-CCNC: 60 U/L (ref 40–150)
ALT SERPL W P-5'-P-CCNC: 22 U/L (ref 0–50)
ANION GAP SERPL CALCULATED.3IONS-SCNC: 7 MMOL/L (ref 3–14)
AST SERPL W P-5'-P-CCNC: 30 U/L (ref 0–45)
B-HCG SERPL-ACNC: <1 IU/L (ref 0–5)
BASOPHILS # BLD AUTO: 0 10E3/UL (ref 0–0.2)
BASOPHILS NFR BLD AUTO: 0 %
BILIRUB SERPL-MCNC: 0.7 MG/DL (ref 0.2–1.3)
BUN SERPL-MCNC: 11 MG/DL (ref 7–30)
CALCIUM SERPL-MCNC: 9.8 MG/DL (ref 8.5–10.1)
CHLORIDE BLD-SCNC: 110 MMOL/L (ref 94–109)
CO2 SERPL-SCNC: 18 MMOL/L (ref 20–32)
CREAT SERPL-MCNC: 0.82 MG/DL (ref 0.52–1.04)
EOSINOPHIL # BLD AUTO: 0 10E3/UL (ref 0–0.7)
EOSINOPHIL NFR BLD AUTO: 0 %
ERYTHROCYTE [DISTWIDTH] IN BLOOD BY AUTOMATED COUNT: 13.9 % (ref 10–15)
GFR SERPL CREATININE-BSD FRML MDRD: >90 ML/MIN/1.73M2
GLUCOSE BLD-MCNC: 99 MG/DL (ref 70–99)
HCT VFR BLD AUTO: 45.1 % (ref 35–47)
HGB BLD-MCNC: 15 G/DL (ref 11.7–15.7)
IMM GRANULOCYTES # BLD: 0 10E3/UL
IMM GRANULOCYTES NFR BLD: 0 %
LIPASE SERPL-CCNC: 101 U/L (ref 73–393)
LYMPHOCYTES # BLD AUTO: 1.2 10E3/UL (ref 0.8–5.3)
LYMPHOCYTES NFR BLD AUTO: 13 %
MCH RBC QN AUTO: 28.2 PG (ref 26.5–33)
MCHC RBC AUTO-ENTMCNC: 33.3 G/DL (ref 31.5–36.5)
MCV RBC AUTO: 85 FL (ref 78–100)
MONOCYTES # BLD AUTO: 0.5 10E3/UL (ref 0–1.3)
MONOCYTES NFR BLD AUTO: 5 %
NEUTROPHILS # BLD AUTO: 7.5 10E3/UL (ref 1.6–8.3)
NEUTROPHILS NFR BLD AUTO: 82 %
NRBC # BLD AUTO: 0 10E3/UL
NRBC BLD AUTO-RTO: 0 /100
PLATELET # BLD AUTO: 395 10E3/UL (ref 150–450)
POTASSIUM BLD-SCNC: 4.9 MMOL/L (ref 3.4–5.3)
PROT SERPL-MCNC: 8.3 G/DL (ref 6.8–8.8)
RBC # BLD AUTO: 5.31 10E6/UL (ref 3.8–5.2)
SODIUM SERPL-SCNC: 135 MMOL/L (ref 133–144)
WBC # BLD AUTO: 9.3 10E3/UL (ref 4–11)

## 2022-04-17 PROCEDURE — 85025 COMPLETE CBC W/AUTO DIFF WBC: CPT | Performed by: FAMILY MEDICINE

## 2022-04-17 PROCEDURE — 96372 THER/PROPH/DIAG INJ SC/IM: CPT | Performed by: FAMILY MEDICINE

## 2022-04-17 PROCEDURE — 84702 CHORIONIC GONADOTROPIN TEST: CPT | Performed by: FAMILY MEDICINE

## 2022-04-17 PROCEDURE — 70450 CT HEAD/BRAIN W/O DYE: CPT

## 2022-04-17 PROCEDURE — 99285 EMERGENCY DEPT VISIT HI MDM: CPT | Mod: 25 | Performed by: FAMILY MEDICINE

## 2022-04-17 PROCEDURE — 83690 ASSAY OF LIPASE: CPT | Performed by: FAMILY MEDICINE

## 2022-04-17 PROCEDURE — 99285 EMERGENCY DEPT VISIT HI MDM: CPT | Performed by: FAMILY MEDICINE

## 2022-04-17 PROCEDURE — 36415 COLL VENOUS BLD VENIPUNCTURE: CPT | Performed by: FAMILY MEDICINE

## 2022-04-17 PROCEDURE — 250N000013 HC RX MED GY IP 250 OP 250 PS 637: Performed by: FAMILY MEDICINE

## 2022-04-17 PROCEDURE — 80053 COMPREHEN METABOLIC PANEL: CPT | Performed by: FAMILY MEDICINE

## 2022-04-17 PROCEDURE — 250N000011 HC RX IP 250 OP 636: Performed by: FAMILY MEDICINE

## 2022-04-17 RX ORDER — OLANZAPINE 5 MG/1
5 TABLET, ORALLY DISINTEGRATING ORAL ONCE
Status: DISCONTINUED | OUTPATIENT
Start: 2022-04-17 | End: 2022-04-17

## 2022-04-17 RX ORDER — LORAZEPAM 1 MG/1
1 TABLET ORAL ONCE
Status: COMPLETED | OUTPATIENT
Start: 2022-04-17 | End: 2022-04-17

## 2022-04-17 RX ORDER — KETOROLAC TROMETHAMINE 30 MG/ML
30 INJECTION, SOLUTION INTRAMUSCULAR; INTRAVENOUS ONCE
Status: COMPLETED | OUTPATIENT
Start: 2022-04-17 | End: 2022-04-17

## 2022-04-17 RX ADMIN — KETOROLAC TROMETHAMINE 30 MG: 30 INJECTION, SOLUTION INTRAMUSCULAR at 22:03

## 2022-04-17 RX ADMIN — LORAZEPAM 1 MG: 1 TABLET ORAL at 20:50

## 2022-04-18 ENCOUNTER — OFFICE VISIT (OUTPATIENT)
Dept: BEHAVIORAL HEALTH | Facility: CLINIC | Age: 32
End: 2022-04-18
Payer: COMMERCIAL

## 2022-04-18 VITALS
HEART RATE: 94 BPM | BODY MASS INDEX: 23.78 KG/M2 | WEIGHT: 130 LBS | RESPIRATION RATE: 16 BRPM | SYSTOLIC BLOOD PRESSURE: 120 MMHG | OXYGEN SATURATION: 97 % | TEMPERATURE: 97.8 F | DIASTOLIC BLOOD PRESSURE: 74 MMHG

## 2022-04-18 VITALS
HEART RATE: 116 BPM | BODY MASS INDEX: 26.31 KG/M2 | WEIGHT: 143 LBS | DIASTOLIC BLOOD PRESSURE: 85 MMHG | SYSTOLIC BLOOD PRESSURE: 128 MMHG | HEIGHT: 62 IN

## 2022-04-18 DIAGNOSIS — F41.9 ANXIETY: ICD-10-CM

## 2022-04-18 DIAGNOSIS — F11.20 SEVERE OPIOID USE DISORDER (H): Primary | ICD-10-CM

## 2022-04-18 DIAGNOSIS — F17.200 TOBACCO USE DISORDER: ICD-10-CM

## 2022-04-18 DIAGNOSIS — F43.10 PTSD (POST-TRAUMATIC STRESS DISORDER): ICD-10-CM

## 2022-04-18 DIAGNOSIS — F11.20 OPIOID USE DISORDER, SEVERE, DEPENDENCE (H): Primary | ICD-10-CM

## 2022-04-18 LAB
FENTANYL UR QL: ABNORMAL
HCG UR QL: NEGATIVE

## 2022-04-18 PROCEDURE — 80307 DRUG TEST PRSMV CHEM ANLYZR: CPT | Performed by: FAMILY MEDICINE

## 2022-04-18 PROCEDURE — 250N000013 HC RX MED GY IP 250 OP 250 PS 637: Performed by: EMERGENCY MEDICINE

## 2022-04-18 PROCEDURE — 81025 URINE PREGNANCY TEST: CPT | Performed by: FAMILY MEDICINE

## 2022-04-18 PROCEDURE — 90791 PSYCH DIAGNOSTIC EVALUATION: CPT

## 2022-04-18 PROCEDURE — 99204 OFFICE O/P NEW MOD 45 MIN: CPT | Performed by: FAMILY MEDICINE

## 2022-04-18 PROCEDURE — 250N000013 HC RX MED GY IP 250 OP 250 PS 637: Performed by: FAMILY MEDICINE

## 2022-04-18 RX ORDER — GABAPENTIN 300 MG/1
300 CAPSULE ORAL 3 TIMES DAILY PRN
Qty: 30 CAPSULE | Refills: 0 | Status: SHIPPED | OUTPATIENT
Start: 2022-04-18

## 2022-04-18 RX ORDER — OLANZAPINE 10 MG/1
10 TABLET, ORALLY DISINTEGRATING ORAL ONCE
Status: COMPLETED | OUTPATIENT
Start: 2022-04-18 | End: 2022-04-18

## 2022-04-18 RX ORDER — BUPRENORPHINE AND NALOXONE 8; 2 MG/1; MG/1
1 FILM, SOLUBLE BUCCAL; SUBLINGUAL 3 TIMES DAILY
Qty: 21 FILM | Refills: 0 | Status: SHIPPED | OUTPATIENT
Start: 2022-04-18

## 2022-04-18 RX ORDER — BUPRENORPHINE AND NALOXONE 8; 2 MG/1; MG/1
1 FILM, SOLUBLE BUCCAL; SUBLINGUAL DAILY
Status: DISCONTINUED | OUTPATIENT
Start: 2022-04-18 | End: 2022-04-18 | Stop reason: HOSPADM

## 2022-04-18 RX ADMIN — OLANZAPINE 10 MG: 10 TABLET, ORALLY DISINTEGRATING ORAL at 00:16

## 2022-04-18 RX ADMIN — NICOTINE POLACRILEX 4 MG: 4 GUM, CHEWING ORAL at 10:51

## 2022-04-18 RX ADMIN — BUPRENORPHINE AND NALOXONE 1 FILM: 8; 2 FILM BUCCAL; SUBLINGUAL at 09:06

## 2022-04-18 NOTE — ED NOTES
"Pt refused to take zyprexa, Said writer was all \"twitchy and moving\" and she was stable.  MD notified  "
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
## 2022-04-18 NOTE — ED NOTES
Bed: HW05  Expected date:   Expected time:   Means of arrival:   Comments:  [facility]  32yF  SI, meth use
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
## 2022-04-18 NOTE — ED NOTES
"Pt stating to writer she is ready to discharge, let her know she needs to speak with accessor and MD prior to that happening. She states understanding. Pt requesting another dose of suboxone stating \"I ripped it in half and put half of it on my food tray so it would properly dissolve and now my food tray is gone. \" Writer explained that she will most likely get another one. Writer also states she was seen by writer taking the entire suboxone while writer was present. MD made aware as well.   "

## 2022-04-18 NOTE — ED NOTES
"Patient found to be walking around in room, disorganized. Climbed up onto bedside table to lay down, got off, returned self to bed. Writer asked patient if she would like a warm blanket or something to drink. Patient replied \"no\" and fell back asleep. Maci Jimenez RN on 4/18/2022 at 1:47 AM    "

## 2022-04-18 NOTE — DISCHARGE INSTRUCTIONS
Please follow-up with the recovery clinic for rule 25 initiation and Suboxone management plan this morning.    Make an appointment to follow up with the Primary Care Center (phone: [phone]) or our [facility] (phone: [phone]) in the next 1-2 weeks to establish more convenient primary care as discussed.    Aftercare Plan  You are recommended to follow up with Recovery Clinic (a short walk in the same building as the ED) to complete a substance use assessment. You are interested in residential treatment or lodging plus-type programming. Getting an assessment done is the first step for accessing treatment.   Walk-Ins Available Monday through Friday 9am-3pm  Location: [facility], [address], [address], [place] [address] (next to outpatient lab)  Phone: [phone]    One program that may be a good fit, in addition to those offered at [place] is: [facility] ([phone] or email Mirna Silva at [email or web address], assuming you get your assessment done today, first)    In the meantime, please continue to follow up with your Primary Care Doctor for med management:   Chandu Mcginnis MD    [address] [place] [address] [phone]     If I am feeling unsafe or I am in a crisis, I will:   Contact my established care providers   Call call **CRISIS (**[phone])   Go to the nearest emergency room   Call [phone]     Crisis Lines  Crisis Text Line  Text [phone]  You will be connected with a trained live crisis counselor to provide support.      Community Resources  Fast Tracker  Linking people to mental health and substance use disorder resources  fasttrackermn.org       Additional Information  Today you were seen by a licensed mental health professional through Triage and Transition services, [facility] ([facility])  for a crisis assessment in the Emergency Department at [facility]  [place].  It is recommended that you follow up with your established providers (psychiatrist, mental health therapist, and/or primary care doctor - as relevant) as soon as possible. Coordinators from [facility] will be calling you in the next 24-48 hours to ensure that you have the resources you need.  You can also contact [facility] coordinators directly at [phone]. You may have been scheduled for or offered an appointment with a mental health provider. [facility] maintains an extensive network of licensed behavioral health providers to connect patients with the services they need.  We do not charge providers a fee to participate in our referral network.  We match patients with providers based on a patient's specific needs, insurance coverage, and location.  Our first effort will be to refer you to a provider within your care system, and will utilize providers outside your care system as needed.

## 2022-04-18 NOTE — ED NOTES
"Patient approaching staff at multiple occasions demanding Suboxone. MD notified. MD met with patient several times to provide education/teaching. Toradol ordered for pain. Patient yelling at writer, stating that \"healthcare here is very unfortunate. It's really a shame you guys don't treat people better\". Writer informed patient that Toradol was ordered and can be administered. Patient now refusing Toradol. States, \"Pharmacy doesn't have to verify shit. How about the experimental. There's only two drugs we're not supposed to have. How about that? You tell me if I should take it. There's no kids in the next life\". Continues with delusional and disorganized thoughts. Dr. Rosado notified. Patient agrees to take Toradol after Dr. Rosado met with patient again. Toradol administered as ordered, see eMAR. Maci Jimenez RN on 4/17/2022 at 10:03 PM    "
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
## 2022-04-18 NOTE — ED NOTES
"Pt requesting to speak with MD as well as nicorette gum. Pt had actually tried to walk outside to \"get fresh air\" stating \"im not trying to leave after I have made it this far through detox\".   "

## 2022-04-18 NOTE — ED NOTES
Patient was signed out to me at the end of my partner shift.  The patient was allowed to detoxify all night in the ER and this morning her psychosis is alleviated.  Patient was seen by behavioral medicine who felt the patient would be a good candidate for outpatient treatment and referred her to the recovery clinic for initiation of a rule 25.    Patient states she needs help with her Suboxone management as her Suboxone physician is out in [place] and she cannot get out there to get appropriate dosing and refills.    At this time patient will be sent to the recovery clinic.      Final diagnoses:   Methamphetamine abuse (H)   Heroin abuse (H)   Psychosis, unspecified psychosis type (H)     Please follow-up with the recovery clinic for rule 25 initiation and Suboxone management plan this morning.    Make an appointment to follow up with the Primary Care Center (phone: [phone]) or our [facility] (phone: [phone]) in the next 1-2 weeks to establish more convenient primary care as discussed.    Pierre Luna MD, Pierre Nicholson MD  04/18/22 6577

## 2022-04-18 NOTE — PATIENT INSTRUCTIONS
Continue Suboxone.  I have written for three times per day dosing as you had previously been taking, if you only need twice daily that is fine (just be consistent).    Take gabapentin 300mg three times daily as needed for withdrawal symptoms and/or anxiety.      Rule 25 (chemical health assessment) is scheduled for Wednesday (4/20) @ 1pm as virtual visit.  Please call them at [phone] if the behavioral access phone number - call them later today with your email address.

## 2022-04-18 NOTE — ED NOTES
Emergency Department Patient Sign-out       Brief HPI:  This is a 32 year old female signed out to me by Dr. Rosado .  See initial ED Provider note for details of the presentation.            Significant Events prior to my assuming care: Patient with meth use, psychosis likely related to drug use. Needs assessment in AM.      Exam:   Patient Vitals for the past 24 hrs:   BP Temp Temp src Pulse Resp SpO2 Weight   04/17/22 2352 -- -- -- -- -- -- 59 kg (130 lb)   04/17/22 2342 115/77 97.8  F (36.6  C) Oral 118 18 97 % --   04/17/22 1912 -- -- -- (!) 130 20 99 % --           ED RESULTS:   Results for orders placed or performed during the hospital encounter of 04/17/22 (from the past 24 hour(s))   CBC with platelets differential     Status: Abnormal    Collection Time: 04/17/22  7:44 PM    Narrative    The following orders were created for panel order CBC with platelets differential.  Procedure                               Abnormality         Status                     ---------                               -----------         ------                     CBC with platelets and d...[[id number]]  Abnormal            Final result                 Please view results for these tests on the individual orders.   Comprehensive metabolic panel     Status: Abnormal    Collection Time: 04/17/22  7:44 PM   Result Value Ref Range    Sodium 135 133 - 144 mmol/L    Potassium 4.9 3.4 - 5.3 mmol/L    Chloride 110 (H) 94 - 109 mmol/L    Carbon Dioxide (CO2) 18 (L) 20 - 32 mmol/L    Anion Gap 7 3 - 14 mmol/L    Urea Nitrogen 11 7 - 30 mg/dL    Creatinine 0.82 0.52 - 1.04 mg/dL    Calcium 9.8 8.5 - 10.1 mg/dL    Glucose 99 70 - 99 mg/dL    Alkaline Phosphatase 60 40 - 150 U/L    AST 30 0 - 45 U/L    ALT 22 0 - 50 U/L    Protein Total 8.3 6.8 - 8.8 g/dL    Albumin 3.8 3.4 - 5.0 g/dL    Bilirubin Total 0.7 0.2 - 1.3 mg/dL    GFR Estimate >90 >60 mL/min/1.73m2   Lipase     Status: Normal    Collection Time: 04/17/22  7:44 PM    Result Value Ref Range    Lipase 101 73 - 393 U/L   CBC with platelets and differential     Status: Abnormal    Collection Time: 04/17/22  7:44 PM   Result Value Ref Range    WBC Count 9.3 4.0 - 11.0 10e3/uL    RBC Count 5.31 (H) 3.80 - 5.20 10e6/uL    Hemoglobin 15.0 11.7 - 15.7 g/dL    Hematocrit 45.1 35.0 - 47.0 %    MCV 85 78 - 100 fL    MCH 28.2 26.5 - 33.0 pg    MCHC 33.3 31.5 - 36.5 g/dL    RDW 13.9 10.0 - 15.0 %    Platelet Count 395 150 - 450 10e3/uL    % Neutrophils 82 %    % Lymphocytes 13 %    % Monocytes 5 %    % Eosinophils 0 %    % Basophils 0 %    % Immature Granulocytes 0 %    NRBCs per 100 WBC 0 <1 /100    Absolute Neutrophils 7.5 1.6 - 8.3 10e3/uL    Absolute Lymphocytes 1.2 0.8 - 5.3 10e3/uL    Absolute Monocytes 0.5 0.0 - 1.3 10e3/uL    Absolute Eosinophils 0.0 0.0 - 0.7 10e3/uL    Absolute Basophils 0.0 0.0 - 0.2 10e3/uL    Absolute Immature Granulocytes 0.0 <=0.4 10e3/uL    Absolute NRBCs 0.0 10e3/uL   HCG quantitative pregnancy (blood)     Status: Normal    Collection Time: 04/17/22  7:44 PM   Result Value Ref Range    hCG Quantitative <1 0 - 5 IU/L   CT Head w/o Contrast     Status: None    Collection Time: 04/17/22 11:43 PM    Narrative    EXAM: CT HEAD W/O CONTRAST  LOCATION: [facility]  DATE/TIME: 4/17/2022 11:24 PM    INDICATION: Trauma - Head Injury  COMPARISON: None.  TECHNIQUE: Routine CT Head without IV contrast. Multiplanar reformats. Dose reduction techniques were used.    FINDINGS:  INTRACRANIAL CONTENTS: No intracranial hemorrhage, extraaxial collection, or mass effect.  No CT evidence of acute infarct. Normal parenchymal attenuation. Normal ventricles and sulci.     VISUALIZED ORBITS/SINUSES/MASTOIDS: No intraorbital abnormality. No paranasal sinus mucosal disease. No middle ear or mastoid effusion.    BONES/SOFT TISSUES: No acute abnormality.      Impression    IMPRESSION:  1.  Normal head CT.       ED MEDICATIONS:   Medications    LORazepam (ATIVAN) tablet 1 mg (1 mg Oral Given 4/17/22 2050)   ketorolac (TORADOL) injection 30 mg (30 mg Intramuscular Given 4/17/22 2203)   OLANZapine zydis (zyPREXA) ODT tab 10 mg (10 mg Oral Given 4/18/22 0016)         Impression:    ICD-10-CM    1. Methamphetamine abuse (H)  F15.10    2. Heroin abuse (H)  F11.10    3. Psychosis, unspecified psychosis type (H)  F29        Plan:    BEC assessment in AM.        MD Ryan Lawton, Aayush Sosa MD  04/18/22 4643

## 2022-04-25 ENCOUNTER — TELEPHONE (OUTPATIENT)
Dept: FAMILY MEDICINE | Facility: CLINIC | Age: 32
End: 2022-04-25
Payer: COMMERCIAL

## 2022-04-25 NOTE — TELEPHONE ENCOUNTER
Please reach out to patient.  Needs to be seen in Recovery Clinic for follow-up and medication refills.  Can be seen tomorrow, appointment for 5/5 with me at [facility] needs to be cancelled.  Thanks.    Ruth Alanis, DO on 4/25/2022 at 4:00 PM

## 2022-04-25 NOTE — TELEPHONE ENCOUNTER
Reason for Call:  Medication or medication refill:    Do you use a [facility]?  Name of the pharmacy and phone number for the current request:  [facility]    Name of the medication requested: Suboxone    Other request: none    Can we leave a detailed message on this number? YES    Phone number patient can be reached at: Cell number on file:    Telephone Information:   Mobile [phone]       Best Time: any    Call taken on 4/25/2022 at 2:41 PM by Toney Arreaga

## 2022-04-25 NOTE — TELEPHONE ENCOUNTER
Spoke with patient, she will do a walk in tomorrow 4/26/22. Patient is aware she needs to be seen in [facility] not [place]

## 2022-04-27 ENCOUNTER — TELEPHONE (OUTPATIENT)
Dept: BEHAVIORAL HEALTH | Facility: CLINIC | Age: 32
End: 2022-04-27

## 2022-04-27 NOTE — TELEPHONE ENCOUNTER
Writer placed a second call this morning to check in patient. Unable to get a hold of patient. Writer left a second voicemail with writer's call back number. Writer will inform pt's .

## 2022-04-27 NOTE — TELEPHONE ENCOUNTER
Called pt twice up until 10:50 a.m. for her TOAN Assessment scheduled at 10;30 a.m.  Messages were left first with the number to call to check in and then with intake's number should she wish to reschedule.  [facility] and it appears she may have missed her appointment scheduled on 4/20/2022 as well.

## 2022-04-27 NOTE — TELEPHONE ENCOUNTER
Writer placed a call this morning to check in patient for video appt today at 10:30am. Unable to get a hold of patient at mobile number. Writer left a voicemail with writer's call back number in pt's mobile line. Writer tried home phone, but phone line does not have a voicemail box. Writer will try again.

## 2022-04-29 ENCOUNTER — TELEPHONE (OUTPATIENT)
Dept: BEHAVIORAL HEALTH | Facility: CLINIC | Age: 32
End: 2022-04-29
Payer: COMMERCIAL

## 2022-05-03 ENCOUNTER — TELEPHONE (OUTPATIENT)
Dept: BEHAVIORAL HEALTH | Facility: CLINIC | Age: 32
End: 2022-05-03

## 2022-05-03 ENCOUNTER — HOSPITAL ENCOUNTER (OUTPATIENT)
Dept: BEHAVIORAL HEALTH | Facility: CLINIC | Age: 32
Discharge: HOME | End: 2022-05-03
Attending: FAMILY MEDICINE | Admitting: FAMILY MEDICINE
Payer: COMMERCIAL

## 2022-05-03 VITALS — WEIGHT: 135 LBS | BODY MASS INDEX: 24.84 KG/M2 | HEIGHT: 62 IN

## 2022-05-03 PROCEDURE — H0001 ALCOHOL AND/OR DRUG ASSESS: HCPCS | Mod: TEL,95 | Performed by: COUNSELOR

## 2022-05-03 ASSESSMENT — COLUMBIA-SUICIDE SEVERITY RATING SCALE - C-SSRS
1. IN THE PAST MONTH, HAVE YOU WISHED YOU WERE DEAD OR WISHED YOU COULD GO TO SLEEP AND NOT WAKE UP?: NO
ATTEMPT LIFETIME: NO
6. HAVE YOU EVER DONE ANYTHING, STARTED TO DO ANYTHING, OR PREPARED TO DO ANYTHING TO END YOUR LIFE?: NO
2. HAVE YOU ACTUALLY HAD ANY THOUGHTS OF KILLING YOURSELF?: NO
TOTAL  NUMBER OF INTERRUPTED ATTEMPTS LIFETIME: NO
1. HAVE YOU WISHED YOU WERE DEAD OR WISHED YOU COULD GO TO SLEEP AND NOT WAKE UP?: YES
TOTAL  NUMBER OF ABORTED OR SELF INTERRUPTED ATTEMPTS LIFETIME: NO

## 2022-05-03 ASSESSMENT — ANXIETY QUESTIONNAIRES
2. NOT BEING ABLE TO STOP OR CONTROL WORRYING: MORE THAN HALF THE DAYS
GAD7 TOTAL SCORE: 8
7. FEELING AFRAID AS IF SOMETHING AWFUL MIGHT HAPPEN: SEVERAL DAYS
4. TROUBLE RELAXING: NOT AT ALL
3. WORRYING TOO MUCH ABOUT DIFFERENT THINGS: SEVERAL DAYS
6. BECOMING EASILY ANNOYED OR IRRITABLE: SEVERAL DAYS
1. FEELING NERVOUS, ANXIOUS, OR ON EDGE: MORE THAN HALF THE DAYS
5. BEING SO RESTLESS THAT IT IS HARD TO SIT STILL: SEVERAL DAYS

## 2022-05-03 ASSESSMENT — PAIN SCALES - GENERAL: PAINLEVEL: NO PAIN (0)

## 2022-05-03 ASSESSMENT — PATIENT HEALTH QUESTIONNAIRE - PHQ9: SUM OF ALL RESPONSES TO PHQ QUESTIONS 1-9: 5

## 2022-05-03 NOTE — TELEPHONE ENCOUNTER
Patient have a video appointment today at 1pm with [facility]. Writer placed a call this morning to check in patient. Unable to get a hold of patient. Patient's vm box is full. Writer unable to leave a vm behind. Writer will try again.
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
## 2022-05-04 ASSESSMENT — ANXIETY QUESTIONNAIRES: GAD7 TOTAL SCORE: 8

## 2022-05-19 NOTE — ADDENDUM NOTE
Encounter addended by: Jennifer Lake LAD on: 5/19/2022 12:37 PM   Actions taken: Clinical Note Signed

## 2022-06-04 ENCOUNTER — HEALTH MAINTENANCE LETTER (OUTPATIENT)
Age: 32
End: 2022-06-04

## 2022-06-27 ENCOUNTER — HOSPITAL ENCOUNTER (OUTPATIENT)
Dept: BEHAVIORAL HEALTH | Facility: CLINIC | Age: 32
Discharge: HOME | End: 2022-06-27
Attending: FAMILY MEDICINE | Admitting: FAMILY MEDICINE
Payer: COMMERCIAL

## 2022-06-27 VITALS — WEIGHT: 135 LBS | BODY MASS INDEX: 24.84 KG/M2 | HEIGHT: 62 IN

## 2022-06-27 PROCEDURE — H0001 ALCOHOL AND/OR DRUG ASSESS: HCPCS | Mod: TEL,95 | Performed by: COUNSELOR

## 2022-06-27 ASSESSMENT — ANXIETY QUESTIONNAIRES
6. BECOMING EASILY ANNOYED OR IRRITABLE: SEVERAL DAYS
4. TROUBLE RELAXING: NOT AT ALL
GAD7 TOTAL SCORE: 6
5. BEING SO RESTLESS THAT IT IS HARD TO SIT STILL: SEVERAL DAYS
GAD7 TOTAL SCORE: 6
1. FEELING NERVOUS, ANXIOUS, OR ON EDGE: SEVERAL DAYS
3. WORRYING TOO MUCH ABOUT DIFFERENT THINGS: SEVERAL DAYS
2. NOT BEING ABLE TO STOP OR CONTROL WORRYING: SEVERAL DAYS
7. FEELING AFRAID AS IF SOMETHING AWFUL MIGHT HAPPEN: SEVERAL DAYS

## 2022-06-27 ASSESSMENT — COLUMBIA-SUICIDE SEVERITY RATING SCALE - C-SSRS
5. HAVE YOU STARTED TO WORK OUT OR WORKED OUT THE DETAILS OF HOW TO KILL YOURSELF? DO YOU INTEND TO CARRY OUT THIS PLAN?: NO
3. HAVE YOU BEEN THINKING ABOUT HOW YOU MIGHT KILL YOURSELF?: NO
4. HAVE YOU HAD THESE THOUGHTS AND HAD SOME INTENTION OF ACTING ON THEM?: NO
1. IN THE PAST MONTH, HAVE YOU WISHED YOU WERE DEAD OR WISHED YOU COULD GO TO SLEEP AND NOT WAKE UP?: NO
2. HAVE YOU ACTUALLY HAD ANY THOUGHTS OF KILLING YOURSELF IN THE PAST MONTH?: NO

## 2022-06-27 ASSESSMENT — PAIN SCALES - GENERAL: PAINLEVEL: NO PAIN (0)

## 2022-06-27 NOTE — PROGRESS NOTES
Type Of Assessment: Comprehensive Assessment Update    Referral Source:  Self & PO  MRN: [id number]    DATE OF SERVICE: June 27, 2022  Date of previous TOAN Assessment: 5/3/2022  Patient confirmed identity through two factor verification: Full Legal Name and SSN    PATIENT'S NAME: Jennifer Stanford  Age: 32 year old  Last 4 SSN: [id number]  Sex: female   Gender Identity: female  Sexual Orientation: Heterosexual  Cultural Background: No, Denies any cultural influences or concerns that need to be considered for treatment  YOB: 1990  Current Address:   [address] [place] [address]  Patient Phone Number:  [phone]   Patient's E-Mail Contact:  [email or web address]  Funding: [facility]  PMI: [id number]  Emergency Contact: Madiha Partida ([facility]) [phone]  DAANES information was provided to patient and patient does not want a copy.     Telemedicine Visit: The patient's condition can be safely assessed and treated via synchronous audio and visual telemedicine encounter.    Reason for Telemedicine Visit: Patient has requested telehealth visit  Originating Site (Patient Location):  Patient's mom's house  Distant Site (Provider Location): Provider Remote Setting- Home Office  Consent:  The patient/guardian has verbally consented to: the potential risks and benefits of telemedicine (video visit) versus in person care; bill my insurance or make self-payment for services provided; and responsibility for payment of non-covered services.   Mode of Communication:  telephone    START TIME: 8:05am  END TIME: 8:25am    As the provider I attest to compliance with applicable laws and regulations related to telemedicine.   Jennifer Stanford was seen for a substance use disorder consult on 6/27/2022 by KYRA Bhardwaj.    Reason for Substance Use Disorder Consult:  Pt reports she was attending treatment at [facility] and left early. In the past two weeks she has been using fentanyl  daily. She reports she has had to have narcan used on her 3 times in the past couple of weeks. She wants to enter inpatient TOAN treatment at this time.    Are you currently having severe withdrawal symptoms that are putting yourself or others in danger? No  Are you currently having severe medical problems that require immediate attention? No  Are you currently having severe emotional or behavioral problems that are putting yourself or others at risk of harm? No    Have you participated in prior substance use disorder evaluations? Yes. When, Where, and What circumstances: 5/3/2022 with this writer.   Have you ever been to detox, inpatient or outpatient treatment for substance related use? List previous treatment: Yes. When, Where, and What circumstances: [facility], and outpatient treatment in [place] through [place].   Have you ever had a gambling problem or had treatment for compulsive gambling? No  Patient does not appear to be in severe withdrawal, an imminent safety risk to self or others, or requiring immediate medical attention and may proceed with the assessment interview.       Substance Age of first use Pattern and duration of use (include amounts and frequency) Date of last use       Withdrawal potential Route of administration   has used Alcohol 12/13 Per 6/27/22 update:  Past month: drinking 1 bottle of wine every other day.  Last use: about a week ago.    Per 5/3/22 TOAN CA:  HU: early 2020, drinking at least 6 pack of beer and more per day. This pattern lasted until summer 2021.     2022: she drank a total of 15 times. She will drink from a beer to maybe a bottle of wine each time.  Last use: 2 days ago, 3-4 glasses of wine    ~1 week ago     no oral   has used Marijuana    10 Per 6/27/22 update:  Past month: using every 3 days or so.  Last use: about 2 weeks ago.    Per 5/3/22 TOAN CA:  HU: teenage years, daily use.     2022: using 1-3 times per week, and she will take a couple  of hits each time.  Last use: 5/2/22, 1-2 hits    ~2 weeks ago     no smoke   has used Amphetamines    14 Per 6/27/22 update:  Meth:   Using every other day and using a 1/4 gram per day.  Last use: about a week ago.    Per 5/3/22 TOAN CA:  Meth:  First use: 14  Last use: 2-3 weeks ago.    HU: late teens & last summer.  Summer 2021: daily use with heroin until December 2021/January 2022. She was doing 1/4 gram shots and 1/2 gram shot or more of heroin at the same time.    2022: she used for a total of 2-3 weeks.    ~1 week ago       no IV   has used Cocaine/crack    26 HU: 28-29 27 no snort   has not used Hallucinogens             has not used Inhalants             has used Heroin 18 Per 6/27/22 update:  Past month: using a 1/4g per day. She isn't sure how much heroin vs fentanyl she was using.  Last use: 1 week ago.    Per 5/3/22 TOAN CA:  HU: summer of 2021.  Summer 2021 - January or February 2022: daily use of 1-2 grams per day. Each shot was a 1/2 gram or more.     Since January or February 2022: daily use, and she would use 1 gram every two days.  Last use: about 3 weeks ago.    1 week ago     no IV   has used Other Opiates 12 Per 6/27/22 update:  Fentanyl:  Past month: using daily and using 1/4 gram per day or less. She wasn't sure how much fentanyl vs heroin she was using.  Last use: 1 week ago    Per 5/3/22 TOAN CA:  Oxycotin/percocet:  HU: late teens  Last use:  Summer 2021     Fentanyl:  Unintentional use.     Suboxone:  Dose: 8-16mg per day  Last use: 5/3/22    1 week ago       no  IV   has used Benzodiazepine    13 Xanax:  Per 6/27/22 update:  Past month: Used once.   Last use: 1 week ago.    Per 5/3/22 TOAN CA:  HU: 2021 when she was having panic attacks at work. Using 2-4 mg per day.  Last use: 4 months ago.  She was prescribed xanax in 2021.    1 week ago   no oral   has not used Barbiturates             has not used Over the counter meds.             has use Caffeine   She will either drink coffee or  "tea.  Coffee: 1-2 cups per day.  Tea: 2 cups or bottles per day.    6/27/22 no oral   has used Nicotine  15/16 Cigarettes: 5-10 per day.  Vape: no 6/27/22 no smoke   has not used other substances not listed above:  Identify:                 Withdrawal symptoms: Have you had any of the following withdrawal symptoms?  hot/cold sweats, restless legs, body aches, cramping.    Have you experienced any cravings?  Yes    Have you had periods of abstinence?  Yes   What was your longest period? 18 months in 2014/15 and return to use was due to \"I started seeing someone who was drinking on a regular basis.\"    What activities have you engaged in when using alcohol/other drugs that could be hazardous to you or others?  The patient reported having a history of using IV drugs, being in unsafe situations and being with unsafe people.     A description of any risk-taking behavior, including behavior that puts the client at risk of exposure to blood-borne or sexually transmitted diseases: IV drug use.    Arrests and legal interventions related to substance use: 2018 DUI from fentanyl . Patient does report being on probation / parole / under the jurisdiction of the court: : [place]. County: [place].    A description of how the patient's use affected their ability to function appropriately in a work setting: She is unable to work due to her addiction.    A description of how the patient's use affected their ability to function appropriately in an educational setting: NA    Leisure time activities that are associated with substance use:  She used meth socially. Typically she likes to use heroin alone.    Do you think your substance use has become a problem for you? She agrees she has a substance abuse problem.    MEDICAL HISTORY  Physical or medical concerns or diagnoses: nothing current.    Do you have any current medical treatment needs not being addressed by inpatient treatment?  NA    Do you need a " referral for a medical provider? no    Current medications:   Patient reports current meds as:   Outpatient Medications Marked as Taking for the 6/27/22 encounter (Hospital Encounter) with Jennifer Lake LADC   Medication Sig     buprenorphine HCl-naloxone HCl (SUBOXONE) 8-2 MG per film Place 1 Film under the tongue 3 times daily     Are you pregnant? No    Do you have any specific physical needs/accommodations? No    MENTAL HEALTH HISTORY:  Have you ever had  hospitalizations or treatment for mental health illness: No    Mental health history, including diagnosis and symptoms, and the effect on the client's ability to function: PTSD, anxiety and depression.     Current mental health treatment including psychotropic medication needed to maintain stability: (Note: The assessment must utilize screening tools approved by the commissioner pursuant to section 245.4863 to identify whether the client screens positive for co-occurring disorders): none current    GAIN-SS Tool:  When was the last time that you had significant problems... 5/3/2022 6/27/2022   with feeling very trapped, lonely, sad, blue, depressed or hopeless about the future? Past month 1+ years ago   with sleep trouble, such as bad dreams, sleeping restlessly, or falling asleep during the day? Past Month 1+ years ago   with feeling very anxious, nervous, tense, scared, panicked or like something bad was going to happen? Past month 1+ years ago   with becoming very distressed & upset when something reminded you of the past? 2 to 12 months ago 2 to 12 months ago   with thinking about ending your life or committing suicide? 1+ years ago Never     When was the last time that you did the following things 2 or more times? 5/3/2022 6/27/2022   Lied or conned to get things you wanted or to avoid having to do something? 2 to 12 months ago 2 to 12 months ago   Had a hard time paying attention at school, work or home? 2 to 12 months ago 2 to 12 months  "ago   Had a hard time listening to instructions at school, work or home? 2 to 12 months ago 2 to 12 months ago   Were a bully or threatened other people? 2 to 12 months ago 1+ years ago   Started physical fights with other people? 2 to 12 months ago 1+ years ago     Have you ever been verbally, emotionally, physically or sexually abused?   No    Family history of substance use and misuse: Patient reports her mom and dad used hard drugs and alcohol.  They currently use marijuana.    The patient's desire for family involvement in the treatment program: She wants her mom to be involved.  Level of family support: \"very good, very supportive.\"    Social network in relation to expected support for recovery: she has sober friends    Are you currently in a significant relationship? Yes.  4B. How long? 7 years              Please describe your significant other's use of mood altering chemicals? He drinks, but not heavily.    Do you have any children (include living arrangements/custody/contact)?:  no    What is your current living situation? Homeless and bouncing between places.    Are you employed/attending school? no    SUMMARY:  Ability to understand written treatment materials: Yes  Ability to understand patient rules and patient rights: Yes  Does the patient recognize needs related to substance use and is willing to follow treatment recommendations: Yes  Does the patient have an opioid use disorder:  has a history of opiate use and was give treatment options, including Medication Assisted Treatment, and information on the risks of opiod use disorder including recognizing and responding to opiod overdose.    ASAM Dimension Scale Ratings:  Dimension 1: 0 Client displays full functioning with good ability to tolerate and cope with withdrawal discomfort. No signs or symptoms of intoxication or withdrawal or resolving signs or symptoms.  Dimension 2: 0 Client displays full functioning with good ability to cope with physical " discomfort.  Dimension 3: 2 Client has difficulty with impulse control and lacks coping skills. Client has thoughts of suicide or harm to others without means; however, the thoughts may interfere with participation in some treatment activities. Client has difficulty functioning in significant life areas. Client has moderate symptoms of emotional, behavioral, or cognitive problems. Client is able to participate in most treatment activities.  Dimension 4: 2 Client displays verbal compliance, but lacks consistent behaviors; has low motivation for change; and is passively involved in treatment.  Dimension 5: 4 No awareness of the negative impact of mental health problems or substance abuse. No coping skills to arrest mental health or addiction illnesses, or prevent relapse.  Dimension 6: 4 Client has (A) Chronically antagonistic significant other, living environment, family, peer group or long-term criminal justice involvement that is harmful to recovery or treatment progress, or (B) Client has an actively antagonistic significant other, family, work, or living environment with immediate threat to the client's safety and well-being.    Category of Substance Severity (ICD-10 Code / DSM 5 Code)     Alcohol Use Disorder The patient does not meet the criteria for an Alcohol use disorder.   Cannabis Use Disorder The patient does not meet the criteria for a Cannabis use disorder.   Hallucinogen Use Disorder The patient does not meet the criteria for a Hallucinogen use disorder.   Inhalant Use Disorder The patient does not meet the criteria for an Inhalant use disorder.   Opioid Use Disorder Severe   (F11.20) (304.00)   Sedative, Hypnotic, or Anxiolytic Use Disorder The patient does not meet the criteria for a Sedative/Hypnotic use disorder.   Stimulant Related Disorder Severe   (F15.20) (304.40) Amphetamine type substance   Tobacco Use Disorder Moderate   (F17.200) (305.1)   Other (or unknown) Substance Use Disorder The  patient does not meet the criteria for a Other (or unknown) Substance use disorder.     A problematic pattern of alcohol/drug use leading to clinically significant impairment or distress, as manifested by at least two of the following, occurring within a 12-month period:    1.) Alcohol/drug is often taken in larger amounts or over a longer period than was intended.  Met for Opiates and Amphetamines.  2.) There is a persistent desire or unsuccessful efforts to cut down or control alcohol/drug use.  Met for Opiates.  3.) A great deal of time is spent in activities necessary to obtain alcohol, use alcohol, or recover from its effects.  Met for Opiates and Amphetamines.  4.) Craving, or a strong desire or urge to use alcohol/drug.  Met for Opiates and Amphetamines.  5.) Recurrent alcohol/drug use resulting in a failure to fulfill major role obligations at work, school or home.  Met for Opiates and Amphetamines.  6.) Continued alcohol use despite having persistent or recurrent social or interpersonal problems caused or exacerbated by the effects of alcohol/drug.  Met for Opiates and Amphetamines.  7.) Important social, occupational, or recreational activities are given up or reduced because of alcohol/drug use.  Met for Opiates and Amphetamines.  8.) Recurrent alcohol/drug use in situations in which it is physically hazardous.  Met for Opiates and Amphetamines.  9.) Alcohol/drug use is continued despite knowledge of having a persistent or recurrent physical or psychological problem that is likely to have been caused or exacerbated by alcohol.  Met for Opiates and Amphetamines.  10.) Tolerance, as defined by either of the following: A need for markedly increased amounts of alcohol/drug to achieve intoxication or desired effect..  Met for Opiates and Amphetamines.  11.) Withdrawal, as manifested by either of the following: The characteristic withdrawal syndrome for alcohol/drug (refer to Criteria A and B of the criteria  set for alcohol/drug withdrawal). Met for Opiates and Amphetamines.     Specify if: In early remission:  After full criteria for alcohol/drug use disorder were previously met, none of the criteria for alcohol/drug use disorder have been met for at least 3 months but for less than 12 months (with the exception that Criterion A4,  Craving or a strong desire or urge to use alcohol/drug  may be met).     In sustained remission:   After full criteria for alcohol use disorder were previously met, non of the criteria for alcohol/drug use disorder have been met at any time during a period of 12 months or longer (with the exception that Criterion A4,  Craving or strong desire or urge to use alcohol/drug  may be met).     Specify if:   This additional specifier is used if the individual is in an environment where access to alcohol is restricted.    Mild: Presence of 2-3 symptoms  Moderate: Presence of 4-5 symptoms  Severe: Presence of 6 or more symptoms    Collateral information:   [facility] EMR:  The patient's medical record at [facility] was reviewed and the information contained in the medical record supported the patient's account of her chemical use history and chemical use consequences.    Recommendations:   1)  Complete an gender specific residential CD Treatment Program.  2)  Abstain from all mood-altering chemicals unless prescribed by a licensed provider.   3)  Attend, at minimum, 2 weekly support group meetings, such as 12 step based (AA/NA), SMART Recovery, Health Realizations, and/or Refuge Recovery meetings.     4)  Actively work with [facility] mentor/sponsor on a weekly basis.   5)  Follow all the recommendations of your treatment/medical providers.  6)  Remain law abiding and follow all recommendations of the Courts/PO.    Clinical Substantiation:    Since our initial TOAN CA appointment, pt's addiction appears to have gotten worse. She reports she no longer knows how much fentanyl vs heroin she is  "using in each shot. She has been narcan'ed 3 times in the past 2 weeks due to overdosing. She is homeless and does not have a current phone number.     Referrals/ Alternatives:  [facility]: [facility]     TOAN consult completed by: KRYA Bhardwaj.  Phone Number: [phone]  E-mail Address: [email or web address] Mental Health and Addiction Services Evaluation Department  [address] [place] [address]     *Due to regulation of Title 42 of the Code of Federal Regulations (CFR) Part 2: Confidentiality laws apply to this note and the information wherein.  Thus, this note cannot be copy and pasted into any other health care staff's note nor can it be included in general medical records sent to ANY outside agency without the patient's written consent.    _________________________________________________________________________________________________________________    [facility]  Provider Name:  Jennifer Thomason MA [facility]  Provider Phone Number: [phone]     PATIENT'S NAME:    Jennifer Stanford  PREFERRED NAME: Shefali  PRONOUNS: she/her/hers     MRN:   [id number]  :   1990  ADDRESS: [address]  [place] [address]  ACCT. NUMBER:  [id number]  INSURANCE PROVIDER: CARLOS SIMPSON  PMI: [id number]  DATE OF SERVICE:  22  START TIME: 1:00pm  END TIME: 2:20pm  PREFERRED PHONE: [phone]  May we leave a program related message: Yes  SERVICE MODALITY:  Phone Visit:      Provider verified identity through the following two step process.  Patient provided:  Patient  and Patient's last 4 digits of SSN     The patient has been notified of the following:      \"We have found that certain health care needs can be provided without the need for a face to face visit.  This service lets us provide the care you need with a phone conversation.       I will have " "full access to your [facility] medical record during this entire phone call.   I will be taking notes for your medical record.      Since this is like an office visit, we will bill your insurance company for this service.       There are potential benefits and risks of telephone visits (e.g. limits to patient confidentiality) that differ from in-person visits.?Confidentiality still applies for telephone services, and nobody will record the visit.  It is important to be in a quiet, private space that is free of distractions (including cell phone or other devices) during the visit.??      If during the course of the call I believe a telephone visit is not appropriate, you will not be charged for this service\"     Consent has been obtained for this service by care team member: Yes      UNIVERSAL ADULT Substance Use Disorder DIAGNOSTIC ASSESSMENT     Identifying Information:  Patient is a 32 year old, .  The pronoun use throughout this assessment reflects the patient's chosen pronoun.  Patient was referred for an assessment by [facility].  Patient attended the session alone.     Chief Complaint:   The reason for seeking services at this time is: She reports she has been more sober than not in the past 15 years. She reports she was in a toxic relationship and recently got out of it and is now looking at getting additional supports to help her stay sober. The problem(s) began with opiates when she was a teenager. Patient has attempted to resolve these concerns in the past through treatment and 12 step meetings.  Patient does not appear to be in severe withdrawal, an imminent safety risk to self or others, or requiring immediate medical attention and may proceed with the assessment interview.     Social/Family History:  Patient reported they grew up in the [place] area.  They were raised by both parents. Patient reported that their childhood was \"chaotic.\"  Patient describes current " "relationships with family of origin as \"chaotic.\"       The patient describes their cultural background as white.  Cultural influences and impact on patient's life structure, values, norms, and healthcare: NA.  Contextual influences on patient's health include: Yazdanism kinga.  Patient identified their preferred language to be English. Patient reported they do need the assistance of an  or other support involved in therapy.  Patient reports they are involved in community of kinga activities.  They reports spirituality impacts recovery in the following ways: \"I have a very deep belief in kinga and Yazdanism God.\"      Patient reported had no significant delays in developmental tasks. Patient's highest education level was GED and some college. Patient identified the following learning problems: none reported.  Patient reports they are able to understand written materials.     Patient reported the following relationship history: 7 year relationship that was toxic until early to mid summer 2021.  Patient's current relationship status is single for about a month. Patient identified their sexual orientation as heterosexual.  Patient reported having zero children.      Patient's current living/housing situation involves homelessness. She has been staying with friends temporarily.  They live with others and they report that housing is stable. Patient identified friends as part of their support system.  Patient identified the quality of these relationships as good.       Patient reports engaging in the following recreational/leisure activities: \"I love to be outdoors. I like the dog park. I have 3 dogs that live with my ex. We have a good understanding. When I am working and healthy I can come and go as I please. I love concerts. I am a big fan of art, fashion.\"  Patient is currently unemployed.  Patient reports their income is obtained through family and friends.  Patient does identify finances as a current " stressor.  Cultural and socioeconomic factors do affect the patient's access to services- transportation issues.     Patient reports the following substance related arrests or legal issues: 2018 DUI from fentanyl . Patient does report being on probation / parole / under the jurisdiction of the court: : [place]. County: [place].     Patient's Strengths and Limitations:  Patient identified the following strengths or resources that will help them succeed in treatment: kinga / spirituality, friends / good social support, insight, motivation, sober support group / recovery support  and strong social skills. Things that may interfere with the patient's success in treatment include: financial hardship, lack of family support, transportation concerns and housing instability.      Assessments:  The following assessments were completed by patient for this visit:  PHQ9:   PHQ-9 SCORE 1/31/2017 12/31/2018 2/5/2019 4/2/2019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

## 2022-09-07 ENCOUNTER — HOSPITAL ENCOUNTER (OUTPATIENT)
Dept: BEHAVIORAL HEALTH | Facility: CLINIC | Age: 32
Discharge: HOME | End: 2022-09-07
Attending: FAMILY MEDICINE | Admitting: FAMILY MEDICINE
Payer: COMMERCIAL

## 2022-09-07 VITALS — BODY MASS INDEX: 23.92 KG/M2 | HEIGHT: 62 IN | WEIGHT: 130 LBS

## 2022-09-07 PROCEDURE — H0001 ALCOHOL AND/OR DRUG ASSESS: HCPCS | Mod: TEL,95 | Performed by: COUNSELOR

## 2022-09-07 ASSESSMENT — COLUMBIA-SUICIDE SEVERITY RATING SCALE - C-SSRS
4. HAVE YOU HAD THESE THOUGHTS AND HAD SOME INTENTION OF ACTING ON THEM?: NO
6. HAVE YOU EVER DONE ANYTHING, STARTED TO DO ANYTHING, OR PREPARED TO DO ANYTHING TO END YOUR LIFE?: NO
2. HAVE YOU ACTUALLY HAD ANY THOUGHTS OF KILLING YOURSELF IN THE PAST MONTH?: YES
3. HAVE YOU BEEN THINKING ABOUT HOW YOU MIGHT KILL YOURSELF?: NO
1. IN THE PAST MONTH, HAVE YOU WISHED YOU WERE DEAD OR WISHED YOU COULD GO TO SLEEP AND NOT WAKE UP?: YES
5. HAVE YOU STARTED TO WORK OUT OR WORKED OUT THE DETAILS OF HOW TO KILL YOURSELF? DO YOU INTEND TO CARRY OUT THIS PLAN?: NO

## 2022-09-07 ASSESSMENT — PATIENT HEALTH QUESTIONNAIRE - PHQ9: SUM OF ALL RESPONSES TO PHQ QUESTIONS 1-9: 15

## 2022-09-07 ASSESSMENT — ANXIETY QUESTIONNAIRES
7. FEELING AFRAID AS IF SOMETHING AWFUL MIGHT HAPPEN: NEARLY EVERY DAY
3. WORRYING TOO MUCH ABOUT DIFFERENT THINGS: NEARLY EVERY DAY
GAD7 TOTAL SCORE: 21
5. BEING SO RESTLESS THAT IT IS HARD TO SIT STILL: NEARLY EVERY DAY
1. FEELING NERVOUS, ANXIOUS, OR ON EDGE: NEARLY EVERY DAY
6. BECOMING EASILY ANNOYED OR IRRITABLE: NEARLY EVERY DAY
4. TROUBLE RELAXING: NEARLY EVERY DAY
2. NOT BEING ABLE TO STOP OR CONTROL WORRYING: NEARLY EVERY DAY
GAD7 TOTAL SCORE: 21

## 2022-09-07 ASSESSMENT — PAIN SCALES - GENERAL: PAINLEVEL: NO PAIN (0)

## 2022-09-07 NOTE — PROGRESS NOTES
"Type Of Assessment: Comprehensive Assessment Update    Referral Source:  self  MRN: [id number]    DATE OF SERVICE: 2022  Date of previous TOAN Assessment: 2022  Patient confirmed identity through two factor verification:  and SSN    PATIENT'S NAME: Jennifer \"Shefali\" Abdoulaye  Age: 32 year old  Last 4 SSN: [id number]  Sex: female   Gender Identity: female  Sexual Orientation: Heterosexual  Cultural Background: No, Denies any cultural influences or concerns that need to be considered for treatment  YOB: 1990  Current Address:   [address] [place] [address]  Patient Phone Number:  [phone]   Patient's E-Mail Contact:  [email or web address]  Funding: [facility]  PMI: [id number]  Emergency Contact: Waqar Pedersen (friend) [phone]  DAANES information was provided to patient and patient does not want a copy.     Telemedicine Visit: The patient's condition can be safely assessed and treated via synchronous audio and visual telemedicine encounter.    Reason for Telemedicine Visit: Patient has requested telehealth visit  Originating Site (Patient Location):  Patient's friend's house  Distant Site (Provider Location): Provider Remote Setting- Home Office  Consent:  The patient/guardian has verbally consented to: the potential risks and benefits of telemedicine (video visit) versus in person care; bill my insurance or make self-payment for services provided; and responsibility for payment of non-covered services.   Mode of Communication:  telephone    START TIME: 8:03am  END TIME: 8:54am    As the provider I attest to compliance with applicable laws and regulations related to telemedicine.   Jennifer Stanford was seen for a substance use disorder consult on 2022 by KYRA Bhardwaj.    Reason for Substance Use Disorder Consult:  \"I found myself without housing. I have been to treatment 15-16 times. I am single. I am homeless. I am doing it for myself. My " "mental health, the anxiety and depression has been real bad. I am at my wits end. I am bummed out. I am hoping to go to [facility] detox treatment program. I want to get back on suboxone.\"     Per 6/27/2022 TOAN CA Update:  Reason for Substance Use Disorder Consult:  Pt reports she was attending treatment at [facility] and left early. In the past two weeks she has been using fentanyl daily. She reports she has had to have narcan used on her 3 times in the past couple of weeks. She wants to enter inpatient TOAN treatment at this time.    Are you currently having severe withdrawal symptoms that are putting yourself or others in danger? No  Are you currently having severe medical problems that require immediate attention? No  Are you currently having severe emotional or behavioral problems that are putting yourself or others at risk of harm? No    Have you participated in prior substance use disorder evaluations? Yes. When, Where, and What circumstances: 5/3/2022 & 6/27/22 with this writer   Have you ever been to detox, inpatient or outpatient treatment for substance related use? List previous treatment: Yes. When, Where, and What circumstances: [facility], [facility], and outpatient treatment in [place] through [place] [facility].  She reports a totally of 15-16 treatments she has attended.  Have you ever had a gambling problem or had treatment for compulsive gambling? No  Patient does not appear to be in severe withdrawal, an imminent safety risk to self or others, or requiring immediate medical attention and may proceed with the assessment interview.        Substance Age of first use Pattern and duration of use (include amounts and frequency) Date of last use       Withdrawal potential Route of administration   has used Alcohol 12/13 Per 9/7/22 Update:  Past 3 months: she drank just a couple of times.  Last use: 2 days ago, 2 glasses of wine.    Per 6/27/22 update:  Past month: drinking 1 bottle of wine " every other day.  Last use: about a week ago.     Per 5/3/22 TOAN CA:  HU: early 2020, drinking at least 6 pack of beer and more per day. This pattern lasted until summer 2021.     2022: she drank a total of 15 times. She will drink from a beer to maybe a bottle of wine each time.  Last use: 2 days ago, 3-4 glasses of wine    2 days ago, 2 glasses of wine       no oral   has used Marijuana    10 Per 9/7/22 Update:  Past 3 months: used 3-4 times.  Last use: 3 weeks ago, a couple of hits.    Per 6/27/22 update:  Past month: using every 3 days or so.  Last use: about 2 weeks ago.     Per 5/3/22 TOAN CA:  HU: teenage years, daily use.     2022: using 1-3 times per week, and she will take a couple of hits each time.  Last use: 5/2/22, 1-2 hits    3 weeks ago, a couple of hits.       no smoke   has used Amphetamines    14 Per 9/7/22 Update:  Meth:  Past 3 months: just started using again 2 weeksago.   Past 2 weeks, daily use of less than 1/4 gram.  Last use: 9/6/22    Per 6/27/22 update:  Meth:   Using every other day and using a 1/4 gram per day.  Last use: about a week ago.     Per 5/3/22 TOAN CA:  Meth:  First use: 14  Last use: 2-3 weeks ago.     HU: late teens & last summer.  Summer 2021: daily use with heroin until December 2021/January 2022. She was doing 1/4 gram shots and 1/2 gram shot or more of heroin at the same time.     2022: she used for a total of 2-3 weeks.    9/6/22  1/4 gram          maybe IV   has used Cocaine/crack    26 Per 9/7/22 Update:  No changes    Per 6/27/22 Update:  HU: [age] no snort   has not used Hallucinogens             has not used Inhalants             has used Heroin 18 Per 9/7/22 Update:  Past 3 months: She started using again 2 weeks ago.  Past 2 weeks: daily use of a 1/4 gram or less.    Per 6/27/22 update:  Past month: using a 1/4g per day. She isn't sure how much heroin vs fentanyl she was using.  Last use: 1 week ago.     Per 5/3/22 TOAN CA:  BRENDA: summer of 2021.  Summer 2021 -  "January or February 2022: daily use of 1-2 grams per day. Each shot was a 1/2 gram or more.     Since January or February 2022: daily use, and she would use 1 gram every two days.  Last use: about 3 weeks ago.    9/6/22  1/4gram       yes IV & smoke   has used Other Opiates 12 Per 9/7/22 Update:  No use of any other opiate except suboxone & heroin.  Last use: 6/2022    Suboxone:  Last use: 2 weeks ago.    Per 6/27/22 update:  Fentanyl:  Past month: using daily and using 1/4 gram per day or less. She wasn't sure how much fentanyl vs heroin she was using.  Last use: 1 week ago     Per 5/3/22 TOAN CA:  Oxycotin/percocet:  HU: late teens  Last use:  Summer 2021     Fentanyl:  Unintentional use.     Suboxone:  Dose: 8-16mg per day  Last use: 5/3/22    2 weeks ago          no  ora   has used Benzodiazepine    13 Xanax:  Per 9/7/22 Update:  No use since June 2022    Per 6/27/22 update:  Past month: Used once.   Last use: 1 week ago.     Per 5/3/22 TOAN CA:  HU: 2021 when she was having panic attacks at work. Using 2-4 mg per day.  Last use: 4 months ago.  She was prescribed xanax in 2021.    6/2022    no oral   has not used Barbiturates             has not used Over the counter meds.             has use Caffeine   She will either drink coffee or tea.  Coffee: 1-2 cups per day.  Tea: 2 cups or bottles per day.    9/7/22 no oral   has used Nicotine  15/16 Cigarettes: 5-10 per day.  Vape: no 9/7/22 no smoke   has not used other substances not listed above:  Identify:                 Withdrawal symptoms: Have you had any of the following withdrawal symptoms?    Sweating (Rapid Pulse)  Unable to Sleep  Fatigue / Extremely Tired  Sad / Depressed Feeling  Muscle Aches  Nausea / Vomiting    Have you experienced any cravings?    Meth: 5/10  Heroin: 7/10    Have you had periods of abstinence?  Yes   What was your longest period? 18 months in 2014/15 and return to use was due to \"I started seeing someone who was drinking on a regular " "basis.\"    What activities have you engaged in when using alcohol/other drugs that could be hazardous to you or others?  The patient reported having a history of using IV drugs, being in unsafe situations and being with unsafe people.     A description of any risk-taking behavior, including behavior that puts the client at risk of exposure to blood-borne or sexually transmitted diseases: IV drug use    Arrests and legal interventions related to substance use: 2018 DUI from fentanyl & Domestic Assault charge. Patient does report being on probation / parole / under the jurisdiction of the court: : [place]. County: [place].    A description of how the patient's use affected their ability to function appropriately in a work setting: She is unable to work due to her addiction.    A description of how the patient's use affected their ability to function appropriately in an educational setting: NA    Leisure time activities that are associated with substance use: She used meth socially. Typically she likes to use heroin alone.    Do you think your substance use has become a problem for you? She agrees she has a substance abuse problem.    MEDICAL HISTORY  Physical or medical concerns or diagnoses: none reported    Do you have any current medical treatment needs not being addressed by inpatient treatment?  NA    Do you need a referral for a medical provider? No, she sees Dr. Chandu Mcginnis out of [place] for addiction medicine.    Current medications:   Patient reports current meds as:   Outpatient Medications Marked as Taking for the 9/7/22 encounter (Hospital Encounter) with Jennifer Lake LADC   Medication Sig     buprenorphine HCl-naloxone HCl (SUBOXONE) 8-2 MG per film Place 1 Film under the tongue 3 times daily     Levothyroxine Sodium (SYNTHROID PO)      Are you pregnant? No    Do you have any specific physical needs/accommodations? No    MENTAL HEALTH " HISTORY:  Have you ever had  hospitalizations or treatment for mental health illness: Yes. When, Where, and What circumstances: no hospitalizations, but she has worked with a therapist.    Mental health history, including diagnosis and symptoms, and the effect on the client's ability to function: PTSD, anxiety and depression.     Current mental health treatment including psychotropic medication needed to maintain stability: (Note: The assessment must utilize screening tools approved by the commissioner pursuant to section 245.4863 to identify whether the client screens positive for co-occurring disorders): none reported.    GAIN-SS Tool:  When was the last time that you had significant problems... 5/3/2022 6/27/2022 9/7/2022   with feeling very trapped, lonely, sad, blue, depressed or hopeless about the future? Past month 1+ years ago Past month   with sleep trouble, such as bad dreams, sleeping restlessly, or falling asleep during the day? Past Month 1+ years ago Past Month   with feeling very anxious, nervous, tense, scared, panicked or like something bad was going to happen? Past month 1+ years ago Past month   with becoming very distressed & upset when something reminded you of the past? 2 to 12 months ago 2 to 12 months ago Past month   with thinking about ending your life or committing suicide? 1+ years ago Never Past month     When was the last time that you did the following things 2 or more times? 5/3/2022 6/27/2022 9/7/2022   Lied or conned to get things you wanted or to avoid having to do something? 2 to 12 months ago 2 to 12 months ago Past month   Had a hard time paying attention at school, work or home? 2 to 12 months ago 2 to 12 months ago 2 to 12 months ago   Had a hard time listening to instructions at school, work or home? 2 to 12 months ago 2 to 12 months ago Past month   Were a bully or threatened other people? 2 to 12 months ago 1+ years ago 1+ years ago   Started physical fights with other  "people? 2 to 12 months ago 1+ years ago 1+ years ago       Have you ever been verbally, emotionally, physically or sexually abused?   No    Family history of substance use and misuse: Patient reports her mom and dad used hard drugs and alcohol.  They currently use marijuana.    The patient's desire for family involvement in the treatment program: \"I hope they would be. I would invite them.\"  Level of family support: \"kind of up and down. It sucks, but I am pretty honest about my circumstances.\"    Social network in relation to expected support for recovery: \"my family, my ex a little bit. I have a few friends, but they are kind of estranged right now.\" She reports she used to attend sober support groups and she did find it helpful.    Are you currently in a significant relationship? No    Do you have any children (include living arrangements/custody/contact)?:  no    What is your current living situation? homeless    Are you employed/attending school? no    SUMMARY:  Ability to understand written treatment materials: Yes  Ability to understand patient rules and patient rights: Yes  Does the patient recognize needs related to substance use and is willing to follow treatment recommendations: Yes  Does the patient have an opioid use disorder:  has a history of opiate use and was give treatment options, including Medication Assisted Treatment, and information on the risks of opiod use disorder including recognizing and responding to opiod overdose.    ASAM Dimension Scale Ratings:  Dimension 1: 1 Client can tolerate and cope with withdrawal discomfort. The client displays mild to moderate intoxication or signs and symptoms interfering with daily functioning but does not immediately endanger self or others. Client poses minimal risk of severe withdrawal.  Dimension 2: 0 Client displays full functioning with good ability to cope with physical discomfort.  Dimension 3: 2 Client has difficulty with impulse control and lacks " coping skills. Client has thoughts of suicide or harm to others without means; however, the thoughts may interfere with participation in some treatment activities. Client has difficulty functioning in significant life areas. Client has moderate symptoms of emotional, behavioral, or cognitive problems. Client is able to participate in most treatment activities.  Dimension 4: 1 Client is motivated with active reinforcement, to explore treatment and strategies for change, but ambivalent about illness or need for change.  Dimension 5: 4 No awareness of the negative impact of mental health problems or substance abuse. No coping skills to arrest mental health or addiction illnesses, or prevent relapse.  Dimension 6: 4 Client has (A) Chronically antagonistic significant other, living environment, family, peer group or long-term criminal justice involvement that is harmful to recovery or treatment progress, or (B) Client has an actively antagonistic significant other, family, work, or living environment with immediate threat to the client's safety and well-being.    Category of Substance Severity (ICD-10 Code / DSM 5 Code)     Alcohol Use Disorder The patient does not meet the criteria for an Alcohol use disorder.   Cannabis Use Disorder The patient does not meet the criteria for a Cannabis use disorder.   Hallucinogen Use Disorder The patient does not meet the criteria for a Hallucinogen use disorder.   Inhalant Use Disorder The patient does not meet the criteria for an Inhalant use disorder.   Opioid Use Disorder Severe   (F11.20) (304.00)   Sedative, Hypnotic, or Anxiolytic Use Disorder The patient does not meet the criteria for a Sedative/Hypnotic use disorder.   Stimulant Related Disorder Severe   (F15.20) (304.40) Amphetamine type substance   Tobacco Use Disorder Moderate   (F17.200) (305.1)   Other (or unknown) Substance Use Disorder The patient does not meet the criteria for a Other (or unknown) Substance use  disorder.     A problematic pattern of alcohol/drug use leading to clinically significant impairment or distress, as manifested by at least two of the following, occurring within a 12-month period:    1.) Alcohol/drug is often taken in larger amounts or over a longer period than was intended.  Met for Opiates and Amphetamines.  2.) There is a persistent desire or unsuccessful efforts to cut down or control alcohol/drug use.  Met for Opiates.  3.) A great deal of time is spent in activities necessary to obtain alcohol, use alcohol, or recover from its effects.  Met for Opiates and Amphetamines.  4.) Craving, or a strong desire or urge to use alcohol/drug.  Met for Opiates and Amphetamines.  5.) Recurrent alcohol/drug use resulting in a failure to fulfill major role obligations at work, school or home.  Met for Opiates and Amphetamines.  6.) Continued alcohol use despite having persistent or recurrent social or interpersonal problems caused or exacerbated by the effects of alcohol/drug.  Met for Opiates and Amphetamines.  7.) Important social, occupational, or recreational activities are given up or reduced because of alcohol/drug use.  Met for Opiates and Amphetamines.  8.) Recurrent alcohol/drug use in situations in which it is physically hazardous.  Met for Opiates and Amphetamines.  9.) Alcohol/drug use is continued despite knowledge of having a persistent or recurrent physical or psychological problem that is likely to have been caused or exacerbated by alcohol.  Met for Opiates and Amphetamines.  10.) Tolerance, as defined by either of the following: A need for markedly increased amounts of alcohol/drug to achieve intoxication or desired effect..  Met for Opiates and Amphetamines.  11.) Withdrawal, as manifested by either of the following: The characteristic withdrawal syndrome for alcohol/drug (refer to Criteria A and B of the criteria set for alcohol/drug withdrawal). Met for Opiates and  Amphetamines.     Specify if: In early remission:  After full criteria for alcohol/drug use disorder were previously met, none of the criteria for alcohol/drug use disorder have been met for at least 3 months but for less than 12 months (with the exception that Criterion A4,  Craving or a strong desire or urge to use alcohol/drug  may be met).     In sustained remission:   After full criteria for alcohol use disorder were previously met, non of the criteria for alcohol/drug use disorder have been met at any time during a period of 12 months or longer (with the exception that Criterion A4,  Craving or strong desire or urge to use alcohol/drug  may be met).     Specify if:   This additional specifier is used if the individual is in an environment where access to alcohol is restricted.    Mild: Presence of 2-3 symptoms  Moderate: Presence of 4-5 symptoms  Severe: Presence of 6 or more symptoms    Collateral information:   [facility] EMR:  The patient's medical record at [facility] was reviewed and the information contained in the medical record supported the patient's account of her chemical use history and chemical use consequences.    Recommendations:   1)  Complete a medical detox then enter a residential treatment program.  2)  Complete a [facility] residential based or similar treatment program.  2)  Abstain from all mood-altering chemicals unless prescribed by a licensed provider.   3)  Attend, at minimum, 2 weekly support group meetings, such as 12 step based (AA/NA), SMART Recovery, Health Realizations, and/or Refuge Recovery meetings.     4)  Actively work with a female mentor/sponsor on a weekly basis.   5)  Follow all the recommendations of your treatment/medical providers.  6)  Remain law abiding and follow all recommendations of the Courts/PO.    Clinical Substantiation:    Over the past 3 months pt has participated in inpatient and IOP with sober housing programming. She reports about 2 weeks ago  she returned to using heroin and meth. In the past two weeks she has seen the progression of her addiction increase from smoking to IVing and an increase in tolerance. Pt is interested in a long-term treatment program. With pt's history she would do best in a treatment program outside of the [facility].    Referrals/ Alternatives:  [place] [facility]: [phone]  [facility]: P [phone]  F [phone]  [email or web address]  [facility]: [phone]     TOAN consult completed by: Jennifer Lake [facility].  Phone Number: [phone]  E-mail Address: [email or web address] Department  [address] [place] [address]     *Due to regulation of Title 42 of the Code of Federal Regulations (CFR) Part 2: Confidentiality laws apply to this note and the information wherein.  Thus, this note cannot be copy and pasted into any other health care staff's note nor can it be included in general medical records sent to ANY outside agency without the patient's written consent.

## 2022-10-09 ENCOUNTER — HEALTH MAINTENANCE LETTER (OUTPATIENT)
Age: 32
End: 2022-10-09

## 2023-05-01 ENCOUNTER — HOSPITAL ENCOUNTER (EMERGENCY)
Facility: CLINIC | Age: 33
Discharge: HOME | End: 2023-05-01
Attending: EMERGENCY MEDICINE | Admitting: EMERGENCY MEDICINE
Payer: COMMERCIAL

## 2023-05-01 VITALS
OXYGEN SATURATION: 98 % | SYSTOLIC BLOOD PRESSURE: 146 MMHG | HEIGHT: 62 IN | BODY MASS INDEX: 33.13 KG/M2 | RESPIRATION RATE: 16 BRPM | WEIGHT: 180 LBS | DIASTOLIC BLOOD PRESSURE: 78 MMHG | TEMPERATURE: 98 F | HEART RATE: 109 BPM

## 2023-05-01 DIAGNOSIS — H60.92 OTITIS EXTERNA OF LEFT EAR, UNSPECIFIED CHRONICITY, UNSPECIFIED TYPE: ICD-10-CM

## 2023-05-01 DIAGNOSIS — S09.91XA INJURY OF LEFT EAR, INITIAL ENCOUNTER: ICD-10-CM

## 2023-05-01 PROCEDURE — 99284 EMERGENCY DEPT VISIT MOD MDM: CPT | Performed by: EMERGENCY MEDICINE

## 2023-05-01 PROCEDURE — 99284 EMERGENCY DEPT VISIT MOD MDM: CPT

## 2023-05-01 RX ORDER — NEOMYCIN SULFATE, POLYMYXIN B SULFATE AND HYDROCORTISONE 10; 3.5; 1 MG/ML; MG/ML; [USP'U]/ML
3 SUSPENSION/ DROPS AURICULAR (OTIC) 4 TIMES DAILY
Qty: 10 ML | Refills: 0 | Status: SHIPPED | OUTPATIENT
Start: 2023-05-01

## 2023-05-01 ASSESSMENT — ACTIVITIES OF DAILY LIVING (ADL): ADLS_ACUITY_SCORE: 33

## 2023-05-02 NOTE — ED TRIAGE NOTES
Comes ot the ED for 2-3 days of increasing left ear pain. This pain became too great tonight and felt something in the left ear. Tried to use a screw  to remove the offending issue. There was a slip and felt pain and had bleeding before ED arrival. Her baseline anxiety has increased to a problem for her now.

## 2023-05-02 NOTE — ED PROVIDER NOTES
History     Chief Complaint   Patient presents with     Otalgia     left     Anxiety     HPI  eJnnifer Stanford is a 33 year old female who presents to the emergency department complaining of left ear pain and bleeding.  She reports that she has been dealing with swimmer's ear for a while now, using drops consisting of vinegar, peroxide and alcohol but continues to feel as though there is something in her ear.  Today, she took a screwdriver and stuck it in her ear and had worsening of pain and began having bleeding.  She is concerned that she has a worsening infection.  No significant loss of hearing.  She has not followed up with an ENT physician in the past for her ear problems.    Allergies:  Allergies   Allergen Reactions     Bees        Problem List:    Patient Active Problem List    Diagnosis Date Noted     Chemical dependency (H) 02/05/2019     Priority: Medium        Past Medical History:    Past Medical History:   Diagnosis Date     Abnormal Papanicolaou smear of cervix and cervical HPV      Benzodiazepine dependence (H)      Contact dermatitis and other eczema, due to unspecified cause      GERD (gastroesophageal reflux disease)      Gynecological examination      Hepatitis C      Hypothyroid      Hypothyroidism      Migraine      Opiate dependence (H)      Seizure (H)        Past Surgical History:    Past Surgical History:   Procedure Laterality Date     SURGICAL HISTORY OF -       Tonsillectomy-Age 4       Family History:    Family History   Problem Relation Age of Onset     Depression Mother      Anxiety Disorder Mother      Substance Abuse Mother      Bipolar Disorder Mother      Depression Father      Anxiety Disorder Father      Substance Abuse Father      Dementia Maternal Grandfather      Substance Abuse Brother      Depression Brother      Bipolar Disorder Sister      Substance Abuse Sister      Substance Abuse Sister      Substance Abuse Brother      Unknown/Adopted No family hx of       "Suicide No family hx of      [place] Disease No family hx of      Parkinsonism No family hx of      Autism Spectrum Disorder No family hx of      Intellectual Disability (Mental Retardation) No family hx of      Mental Illness No family hx of        Social History:  Marital Status:  Single [1]  Social History     Tobacco Use     Smoking status: Every Day     Packs/day: 0.50     Types: Cigarettes     Smokeless tobacco: Never     Tobacco comments:     e-cig   Vaping Use     Vaping status: Some Days   Substance Use Topics     Drug use: Yes     Types: Marijuana, Opiates, Heroin, Methamphetamines     Comment: use today of meth and cannibis 4/17/22        Medications:    amoxicillin-clavulanate (AUGMENTIN) 875-125 MG tablet  neomycin-polymyxin-hydrocortisone (CORTISPORIN) 3.5-26158-4 otic suspension  buprenorphine HCl-naloxone HCl (SUBOXONE) 8-2 MG per film  buprenorphine HCl-naloxone HCl (SUBOXONE) 8-2 MG per film  famotidine (PEPCID) 10 MG tablet  gabapentin (NEURONTIN) 300 MG capsule  Levothyroxine Sodium (SYNTHROID PO)  naloxone (NARCAN) 4 MG/0.1ML nasal spray  nicotine polacrilex (NICORETTE) 4 MG gum          Review of Systems   All other systems reviewed and are negative.      Physical Exam   BP: (!) 146/78  Pulse: 109  Temp: 98  F (36.7  C)  Resp: 16  Height: 157.5 cm (5' 2\")  Weight: 81.6 kg (180 lb)  SpO2: 98 %      Physical Exam  Vitals and nursing note reviewed.   Constitutional:       General: She is not in acute distress.     Appearance: She is well-developed. She is not ill-appearing or toxic-appearing.   HENT:      Head: Normocephalic and atraumatic.      Left Ear: Drainage and swelling present.      Ears:      Comments: Small amount of blood is noted coming from canal of left ear.  Canal is swollen with some mild serosanguineous discharge.  Thickened tenderness to manipulation of pinna.  No tenderness to palpation of mastoid.  No fluctuance or evidence of cellulitis noted.  Due to swelling, I am unable " to visualize tympanic membrane.  Eyes:      General: No scleral icterus.     Pupils: Pupils are equal, round, and reactive to light.   Cardiovascular:      Rate and Rhythm: Normal rate.   Pulmonary:      Effort: Pulmonary effort is normal. No respiratory distress.   Musculoskeletal:      Cervical back: Normal range of motion and neck supple.   Skin:     General: Skin is warm and dry.      Coloration: Skin is not pale.      Findings: No rash.   Neurological:      Mental Status: She is alert and oriented to person, place, and time.      Sensory: No sensory deficit.   Psychiatric:         Behavior: Behavior normal.         ED Course                 Procedures             Assessments & Plan (with Medical Decision Making)     I have reviewed the nursing notes.    I have reviewed the findings, diagnosis, plan and need for follow up with the patient.  This patient presented to the emergency department complaining of some bleeding from her left ear after inserting a screwdriver into her ear.  She does have evidence to suggest otitis externa which may be somewhat chronic.  I am unclear how she is actually making the drops that she has been putting in to the ear.  I am unable to visualize tympanic membrane to see if she has perforated the TM, but will treat with oral and topical antibiotics and have her follow-up with ENT for further evaluation.  This was discussed with the patient and she was discharged with instructions for care and follow-up in good condition.        Discharge Medication List as of 5/1/2023  9:24 PM      START taking these medications    Details   amoxicillin-clavulanate (AUGMENTIN) 875-125 MG tablet Take 1 tablet by mouth 2 times daily for 10 days, Disp-20 tablet, R-0, E-Prescribe      neomycin-polymyxin-hydrocortisone (CORTISPORIN) 3.5-49999-5 otic suspension Place 3 drops Into the left ear 4 times daily, Disp-10 mL, R-0, E-Prescribe             Final diagnoses:   Otitis externa of left ear, unspecified  chronicity, unspecified type   Injury of left ear, initial encounter       5/1/2023   [facility] EMERGENCY DEPT     Vicente Galindo MD  05/01/23 8418

## 2023-05-02 NOTE — DISCHARGE INSTRUCTIONS
Follow-up with ENT clinic.  They should contact you to set up an appointment.    Cortisporin otic drops and Augmentin as directed.    Avoid getting any water in ear.    Do not stick anything in your ear other than the prescribed drops.    Return to the emergency department for any problems.

## 2023-09-22 ENCOUNTER — HOSPITAL ENCOUNTER (EMERGENCY)
Facility: HOSPITAL | Age: 33
Discharge: HOME | End: 2023-09-22
Attending: EMERGENCY MEDICINE | Admitting: EMERGENCY MEDICINE
Payer: COMMERCIAL

## 2023-09-22 VITALS
WEIGHT: 155 LBS | TEMPERATURE: 97.5 F | HEIGHT: 62 IN | SYSTOLIC BLOOD PRESSURE: 119 MMHG | BODY MASS INDEX: 28.52 KG/M2 | DIASTOLIC BLOOD PRESSURE: 68 MMHG | OXYGEN SATURATION: 95 % | RESPIRATION RATE: 18 BRPM | HEART RATE: 74 BPM

## 2023-09-22 DIAGNOSIS — F19.11 HISTORY OF SUBSTANCE ABUSE (H): ICD-10-CM

## 2023-09-22 PROBLEM — F11.21 OPIOID DEPENDENCE IN REMISSION (H): Status: ACTIVE | Noted: 2023-04-11

## 2023-09-22 PROCEDURE — 99283 EMERGENCY DEPT VISIT LOW MDM: CPT
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
## 2023-10-06 ENCOUNTER — HOSPITAL ENCOUNTER (EMERGENCY)
Facility: CLINIC | Age: 33
Discharge: HOME | End: 2023-10-07
Attending: EMERGENCY MEDICINE | Admitting: EMERGENCY MEDICINE
Payer: MEDICAID

## 2023-10-06 DIAGNOSIS — F12.10 MARIJUANA ABUSE: ICD-10-CM

## 2023-10-06 DIAGNOSIS — F11.90 OPIOID USE DISORDER: ICD-10-CM

## 2023-10-06 DIAGNOSIS — Z76.0 ENCOUNTER FOR MEDICATION REFILL: ICD-10-CM

## 2023-10-06 DIAGNOSIS — F41.1 GAD (GENERALIZED ANXIETY DISORDER): ICD-10-CM

## 2023-10-06 PROBLEM — F43.10 PTSD (POST-TRAUMATIC STRESS DISORDER): Status: ACTIVE | Noted: 2023-10-06

## 2023-10-06 LAB
HCG UR QL: NEGATIVE
INTERNAL QC OK POCT: NORMAL
POCT KIT EXPIRATION DATE: NORMAL
POCT KIT LOT NUMBER: NORMAL

## 2023-10-06 PROCEDURE — 250N000013 HC RX MED GY IP 250 OP 250 PS 637: Performed by: EMERGENCY MEDICINE

## 2023-10-06 PROCEDURE — 99284 EMERGENCY DEPT VISIT MOD MDM: CPT | Performed by: EMERGENCY MEDICINE

## 2023-10-06 PROCEDURE — 81025 URINE PREGNANCY TEST: CPT | Performed by: EMERGENCY MEDICINE

## 2023-10-06 RX ORDER — DIAZEPAM 10 MG
10 TABLET ORAL 2 TIMES DAILY
Qty: 6 TABLET | Refills: 0 | Status: SHIPPED | OUTPATIENT
Start: 2023-10-06 | End: 2023-10-06

## 2023-10-06 RX ORDER — DIAZEPAM 10 MG
10 TABLET ORAL 2 TIMES DAILY
Qty: 5 TABLET | Refills: 0 | Status: SHIPPED | OUTPATIENT
Start: 2023-10-06 | End: 2023-10-09

## 2023-10-06 RX ORDER — BUPRENORPHINE AND NALOXONE 4; 1 MG/1; MG/1
1 FILM, SOLUBLE BUCCAL; SUBLINGUAL ONCE
Status: COMPLETED | OUTPATIENT
Start: 2023-10-06 | End: 2023-10-06

## 2023-10-06 RX ADMIN — BUPRENORPHINE AND NALOXONE 1 FILM: 4; 1 FILM, SOLUBLE BUCCAL; SUBLINGUAL at 21:06

## 2023-10-06 RX ADMIN — BUPRENORPHINE AND NALOXONE 1 FILM: 4; 1 FILM, SOLUBLE BUCCAL; SUBLINGUAL at 22:24

## 2023-10-06 ASSESSMENT — ACTIVITIES OF DAILY LIVING (ADL)
ADLS_ACUITY_SCORE: 35
ADLS_ACUITY_SCORE: 35

## 2023-10-07 VITALS
SYSTOLIC BLOOD PRESSURE: 104 MMHG | OXYGEN SATURATION: 94 % | HEART RATE: 64 BPM | WEIGHT: 154.1 LBS | BODY MASS INDEX: 28.19 KG/M2 | RESPIRATION RATE: 18 BRPM | DIASTOLIC BLOOD PRESSURE: 66 MMHG | TEMPERATURE: 98.2 F

## 2023-10-07 NOTE — ED PROVIDER NOTES
"ED Provider Note  [facility]      History     Chief Complaint   Patient presents with    Addiction Problem     Using heroin IV, 1/4-1/2 gm daily; would like to get off of it and back on suboxone; denies other drug use; states she \"last used 12 hours ago.\"      HPI  Jennifer Stanford is a 33 year old female with hx of depression, anxiety, opioid dependence who presents to the ED seeking detox.  She presents with suboxone but has not started it yet.  She says she uses 1/4 gm of IV heroin daily.  Last dose was 8 pm yesterday.  She believes it was heroin but can't be sure.  She says she feels like is is having withdrawals and wants to start suboxone.  She is having diarrhea.  She says she also took her last dose of valium today because some of it was lost.  She gets valium 10 mg bid with extra dosing if needed and says she gets 75 valium pills per month.  She drinks some alcohol but denies regular use warranting withdrawals.. She relapsed 3 weeks ago and prior to that was taking suboxone.  She has not arranged an treatment program for herself but is interested in going to one. She doesn't think she is pregnant but doesn't know for sure. She tried taking suboxone a few days ago and it put her into withdrawal.  She been smoking thc as well.  She is homeless currently but will stay with parents, friends or on the street.  No si. No prior suicide attempt.      10/1/23 ED note:  Jennifer Stanford is a 33 y.o. female presenting the ED hoping to be directly admitted to the chemical dependency unit here.  Patient had been given incorrect information that this is feasible from the ED, particularly in the middle of the night.  Serum ethanol 0.189.  No significant metabolic abnormalities, other acute findings that would warrant treatment/hospitalization.  Patient was offered the option to go to community detox which she declined and shortly thereafter walked out of the ED with sober friend.   "       Physical Exam   BP: 99/61  Pulse: 64  Temp: 97.5  F (36.4  C)  Resp: 14  Weight: 69.9 kg (154 lb 1.6 oz)  SpO2: 97 %  Physical Exam  Vitals and nursing note reviewed.   HENT:      Head: Normocephalic and atraumatic.      Nose: Nose normal.   Eyes:      Extraocular Movements: Extraocular movements intact.   Cardiovascular:      Rate and Rhythm: Normal rate.   Pulmonary:      Effort: Pulmonary effort is normal.   Musculoskeletal:         General: No signs of injury.      Cervical back: Normal range of motion.   Skin:     Coloration: Skin is not jaundiced or pale.   Neurological:      General: No focal deficit present.      Mental Status: She is oriented to person, place, and time.   Psychiatric:         Attention and Perception: Attention and perception normal.         Mood and Affect: Mood and affect normal.         Speech: Speech normal.         Behavior: Behavior normal. Behavior is cooperative.         Thought Content: Thought content normal.         Cognition and Memory: Cognition and memory normal.         Judgment: Judgment normal.           ED Course, Procedures, & Data      Procedures            Suicide assessment completed by [facility] (D.E.C., LCSW, etc.)       Results for orders placed or performed during the hospital encounter of 10/06/23   hCG qual urine POCT     Status: Normal   Result Value Ref Range    HCG Qual Urine Negative Negative    Internal QC Check POCT Valid Valid    POCT Kit Lot Number [id number]     POCT Kit Expiration Date 9062024      Medications   buprenorphine HCl-naloxone HCl (SUBOXONE) 4-1 MG per film 1 Film (has no administration in time range)     Labs Ordered and Resulted from Time of ED Arrival to Time of ED Departure   HCG QUALITATIVE URINE POCT - Normal       Result Value    HCG Qual Urine Negative      Internal QC Check POCT Valid      POCT Kit Lot Number [id number]      POCT Kit Expiration Date 9062024       No orders to display          Critical care was not performed.      Medical Decision Making  The patient's presentation was of moderate complexity (a chronic illness mild to moderate exacerbation, progression, or side effect of treatment).    The patient's evaluation involved:  review of external note(s) from 1 sources (10/2/23 note)  ordering and/or review of 1 test(s) in this encounter (see separate area of note for details)    The patient's management necessitated moderate risk (prescription drug management including medications given in the ED).    Assessment & Plan    Jennifer Stanford is a 33 year old female with hx of depression, anxiety, opioid dependence who presents to the ED seeking detox. She denies.  She does not meet criteria for admission to our detox unit.  Upt negative.  She was offered a bed at   She was seen by the DEC  hoping to schedule TOAN assessment.  However her insurance is inactive so was given resources for rule 25 assessment. She already has suboxone so does not need scripts for home.  She is worried about withdrawals so was given suboxone in the ED to observe for withdrawals.  She is feeling better off after starting suboxone.    She wanted detox. [facility] and [facility] full.  She does not qualify for our detox.  She declines [address] [place] [address].  Therefore she was discharge home with her boyfriend.  [facility] referral made.   She is also out of her valium. She is prescribed 10 mg bid plus some extra if needed for anxiety.  She says she gets 75 pills per month.  I verified this with our pharm d and this is correct.  She has no intention of quitting it.  I therefore chose to fill 5 pills so she has enough to get her through the weekend and then contact her pcp on Monday for further refills.      I have reviewed the nursing notes. I have reviewed the findings, diagnosis, plan and need for follow up with the patient.    New Prescriptions    No medications on file       Final diagnoses:   Opioid use disorder   Marijuana abuse       Libby  [facility]  [facility] EMERGENCY DEPARTMENT  10/6/2023     Libby Duff MD  10/08/23 0725
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
## 2023-10-07 NOTE — CONSULTS
Diagnostic Evaluation Consultation  Crisis Assessment    Patient Name: Jennifer Stanford  Age:  33 year old  Legal Sex: female  Gender Identity: female  Pronouns: she/her/hers  Race: White  Ethnicity: Not  or   Language: English      Patient was assessed: In person      Patient location: [facility] EMERGENCY DEPARTMENT                             UREDH-A    Referral Data and Chief Complaint  Jennifer Stanford presents to the ED by  self, with family/friends. Patient is presenting to the ED for the following concerns: Anxiety, Substance use, Worsening psychosocial stress, Depression, Intoxication.       Factors that make the mental health crisis life threatening or complex are:  Patient reportedly has been smoking marijuana and used heroin for the past seven days, when she ran out of heroin. Since then, she has not slept for several days, is feeling sick, nauseas, has diarrhea and vomiting. She reports discomfort during the interview. She admits to drinking alcohol at least a bottle of wine a day ago. She used heroine via IV 1/4 - 1/2 gm daily. She was prescribed Suboxone but did not use the medications for the past week.     She had a period of sobriety  for some time , when she relapsed to heroin in August 2023 after being put out of her apartment. The patient reported the landlord for maintenance issues and was put out shortly afterwards. She has been homeless and couch hopping. These past couple of days she has been staying with her parents. Her father called her friend Vince today to take her to the ED for CD treatment.     The patient denies current suicidal ideation and homicidal ideation. She denies a hx of suicide and homicide. She denies non-suicidal self-harm behavior. She has been struggling with an irritable mood, being restless, pacing and easily up. She denies any detox or chemical dependency treatment since 2021. She has been unemployed for approximately six months  and in the process of applying for disability benefits..      Informed Consent and Assessment Methods  Explained the crisis assessment process, including applicable information disclosures and limits to confidentiality, assessed understanding of the process, and obtained consent to proceed with the assessment.  Assessment methods included conducting a formal interview with patient, review of medical records, collaboration with medical staff, and obtaining relevant collateral information from family and community providers when available.  : done     Patient response to interventions: acceptance expressed  Coping skills were attempted to reduce the crisis:  Patient was cooperative with her father and friend's intervention to come to the ED     History of the Crisis   Patient presented to the ED a week ago, but left when she was not able to access inpatient substance abuse treatment immediately. She left the ED and declined community based detox. Patient reportedly has a prior mental health dx of PTSD; Hx of methamphetamine and opioid use 2022; hx of violent behaviors. Hx of NSSIB by hitting her head and chest.    Brief Psychosocial History  Family:  Single, Children no  Support System:  Significant Other, Parent(s)  Employment Status:  unemployed  Source of Income:  unknown  Financial Environmental Concerns:  unemployed, other (see comments) (Homeless)  Current Hobbies:  television/movies/videos  Barriers in Personal Life:  mental health concerns    Significant Clinical History  Current Anxiety Symptoms:  anxious, racing thoughts, excessive worry  Current Depression/Trauma:  avoidance, negativistic, irritable, hopelessness, helplessness  Current Somatic Symptoms:  sweating, flushing, shaking, racing thoughts, anxious, excessive worry  Current Psychosis/Thought Disturbance:  impulsive, inattentive, high risk behavior  Current Eating Symptoms:   (Patient has not been eating regularly due to substance use)  Chemical Use  History:  Alcohol: Daily  Last Use:: 10/05/23  Benzodiazepines: None  Opiates: Street buy pills, Heroin IV  Last Use:: 10/06/23  Cocaine: None  Marijuana: Daily  Last Use:: 10/06/23  Other Use: None  Withdrawal Symptoms: Tremors, Nausea  Addictions:  (None identified)   Past diagnosis:  ADHD, Anxiety Disorder, Depression, Substance Use Disorder  Family history:  No known history of mental health or chemical health concerns  Past treatment:  Individual therapy, Psychiatric Medication Management, Primary Care  Details of most recent treatment:  Suboxone treatment prescribed by primary physician  Other relevant history:  n.a.       Collateral Information  Is there collateral information: Yes     Collateral information name, relationship, phone number:  Vince Scott @ [phone]    What happened today: I really don't know. I was at work when her father called and ask me to take her to the ED so she can get into detox. I don't know, but think that she has been using drugs for at least one week. We were room mates when the landlord put her out for complaining about the maintenance issues.     What is different about patient's functioning: Nothing. She may be using less drugs this time around.     Concern about alcohol/drug use:   Yes.    What do you think the patient needs:  treatment     Has patient made comments about wanting to kill themselves/others: no    If d/c is recommended, can they take part in safety/aftercare planning:  yes    Additional collateral information:  Her substance use caused changes in her mood. She is very unhappy, more stressed, being irritable, pacing, waking and not eating regularly     Risk Assessment  [place] Suicide Severity Rating Scale Full Clinical Version: 10/6/2023       [place]ty Suicide Severity Rating Scale Recent:  10/06/2023   Suicidal Ideation (Recent)  Q1 Wished to be Dead (Past Month): no  Q2 Suicidal Thoughts (Past Month): no  Q3 Suicidal Thought Method: no  Q4 Suicidal Intent  without Specific Plan: no  Q5 Suicide Intent with Specific Plan: no  Level of Risk per Screen: low risk  Intensity of Ideation (Recent)  Most Severe Ideation Rating (Past 1 Month):  (Patient denies)  Description of Most Severe Ideation (Past 1 Month): Patient denies  Frequency (Past 1 Month):  (Patient denies)  Duration (Past 1 Month):  (Patient denies)  Controllability (Past 1 Month):  (Patient denies)  Deterrents (Past 1 Month): Does not apply  Reasons for Ideation (Past 1 Month): Does not apply  Suicidal Behavior (Recent)  Actual Attempt (Past 3 Months): No  Total Number of Actual Attempts (Past 3 Months): 0  Has subject engaged in non-suicidal self-injurious behavior? (Past 3 Months): No  Interrupted Attempts (Past 3 Months): No  Total Number of Interrupted Attempts (Past 3 Months): 0  Aborted or Self-Interrupted Attempt (Past 3 Months): No  Total Number of Aborted or Self-Interrupted Attempts (Past 3 Months): 0  Preparatory Acts or Behavior (Past 3 Months): No  Total Number of Preparatory Acts (Past 3 Months): 0  Preparatory Acts or Behavior Description (Past 3 Months): Patient denies    Environmental or Psychosocial Events: work or task failure, helplessness/hopelessness, impulsivity/recklessness, unemployment/underemployment, unstable housing, homelessness, ongoing abuse of substances  Protective Factors: Protective Factors: strong bond to family unit, community support, or employment, able to access care without barriers, help seeking    Does the patient have thoughts of harming others? Feels Like Hurting Others: no  Previous Attempt to Hurt Others: no  Current presentation: Irritable  Violence Threats in Past 6 Months: N.A.  Current Violence Plan or Thoughts: n.a.  Is the patient engaging in sexually inappropriate behavior?: no  Duty to warn initiated: no  Duty to warn details: n.a.    Is the patient engaging in sexually inappropriate behavior?  no        Mental Status Exam   Affect: Flat,  Appropriate  Appearance: Disheveled  Attention Span/Concentration: Inattentive  Eye Contact: Variable    Fund of Knowledge: Appropriate   Language /Speech Content: Fluent  Language /Speech Volume: Normal  Language /Speech Rate/Productions: Normal  Recent Memory: Intact  Remote Memory: Intact  Mood: Anxious, Irritable  Orientation to Person: Yes   Orientation to Place: Yes  Orientation to Time of Day: Yes  Orientation to Date: Yes     Situation (Do they understand why they are here?): Yes  Psychomotor Behavior: Normal  Thought Content: Clear  Thought Form: Intact     Mini-Cog Assessment  Number of Words Recalled:    Clock-Drawing Test:     Three Item Recall:    Mini-Cog Total Score:       Medication  Psychotropic medications:   Medication Orders - Psychiatric (From admission, onward)      None             Current Care Team  Patient Care Team:  Chandu Mcginnis MD as PCP - General (Family Practice)  Ruth Alanis DO as Assigned PCP    Diagnosis  Patient Active Problem List   Diagnosis Code    Chemical dependency (H) F19.20    Anxiety F41.9    Depressive disorder F32.A    Encounter for long-term (current) use of high-risk medication Z79.899    Hepatitis C virus infection B19.20    History of heroin abuse (H) F11.11    Opioid dependence in remission (H) F11.21    PTSD (post-traumatic stress disorder) F43.10       Primary Problem This Admission  Active Hospital Problems    PTSD (post-traumatic stress disorder)      Opioid dependence in remission (H)        Clinical Summary and Substantiation of Recommendations   The patient presents to the ED requesting detox for substance use. Patient had been using herione, marijuana at least daily, and some days drank alcohol. She was prescribed Suboxone but has not been taking the medications. Patient is currently experiencing wiithdrawal symptoms. Patient had been sober for a period, when she relapsed in August 2023 after being put out of her apartment due to a dispute  with the landlord.     She struggles with an irritable mood, sleeping difficulties, being worried and anxious. She denies suicidal ideation, homicidal ideation and does not appear to be psychotic. She also denies hallucinations and delusions. She remains homeless, and has been couch hopping between family and friends. Patient is unemployed and in the process of applying for disability benefits.     She participates in individual therapy at least monthly, with a plan to increase sessions to twice per month. Patient is medically appropriate for Detox and CD treatment.       Patient coping skills attempted to reduce the crisis:  Patient was cooperative with her father and friend's intervention to come to the ED    Disposition  Recommended disposition: Detox, Substance Abuse Disorder Treatment        Reviewed case and recommendations with attending provider. Attending Name: Dr Libby Duff       Attending concurs with disposition: yes       Patient and/or validated legal guardian concurs with disposition:   yes       Final disposition:  discharge (Detox and Chemical dependency treatment (Assessment))    Legal status on admission:      Assessment Details   Total duration spent with the patient: 30 min     CPT code(s) utilized: 25002 - Psychotherapy for Crisis - 60 (30-74*) min    ZHANNA Ivy, [facility], Psychotherapist  [facility] - Triage & Transition Services  Callback: [phone]

## 2023-10-07 NOTE — DISCHARGE INSTRUCTIONS
There are no detox beds available so discharge home with boyfriend.    Script given for valium 10 mg twice daily for 2.5 days.  (This refill will get you through until Monday allowing you to talk to your doctor Monday to get refills).     Valium and suboxone can both cause sedation.  They should be taken cautiously if taken together.         [facility] Health Resources     Rule 25  If you cannot afford treatment or your insurance does not pay for treatment; you will need a Rule 25 Assessment.   Contact Numbers for a Rule 25 Assessment in [place]:      [facility]: [phone]   [address] [place] [address]: [phone]   [facility]: [phone]    [facility]: [phone]   [facility]: [phone]  [facility]: [phone]  Detox Facilities  Withdrawal from alcohol or your drug of choice can lead to medical emergencies.  Detoxing is a 2-3 day stay at a facility where you can safely stop using your drug of choice. Detox options for meth use:       Withdrawal Management (all substances) - [place]: [phone]  [facility] [place]: [phone]   [facility]: [phone]  We provide medication-assisted withdrawal support for withdraw from alcohol and/or drugs. We also provide a comprehensive assessment within the first 72 hours of your stay and connections to other treatment.    Address: [address] [place] [id number]  Walk-in. We also take referrals from family, hospital transfers, and police.  Hours: 24 hours a day, 365 days a year  Location: 3rd floor       Treatment Facilities  Programs that help people face their addiction(s) and develop new tools to move forward.  A person must be fully detoxed before entering these programs.      [facility]: [phone]  [facility]: [phone]  [facility]: [phone]  [facility] [phone]  [facility]: [phone]  [facility]: [phone]               Last Modified by Dawson  "Xin at 6/16/22 2208        Aftercare Plan  If I am feeling unsafe or I am in a crisis, I will:   Contact my established care providers   Call the National Suicide Prevention Lifeline: 988  Go to the nearest emergency room   Call 911     Warning signs that I or other people might notice when a crisis is developing for me: Being irritable, restless, urges to use substances,upset and pacing around.     Things I am able to do on my own to cope or help me feel better:  Talk to sober friends, family.   Listen to my favorite music, talking to my friends.      Things I am able to do on my own to cope or help me feel better: -Take a deep breath and sit down if needed.      Think before acting. -I can try practicing square breathing when I begin to feel anxious - inhale through the nose for the count of 4 and the first line on the square.      Exhale through the mouth for the count of 4 for the second line of the square. Repeat to complete the square. Repeat the square as many times as needed. -I can also use my five senses to practice mindfulness and grounding.      What are five things I can see, four things I can hear, three things I can feel, two things I can smell, and one thing I can taste. -     Download a meditation venu and spend 15-20 minutes per day mediating/relaxing. Some apps to download include Calm, Headspace, and Insight Timer. All of these apps have free version.       Things that I am able to do with others to cope or help me better: Call and talk to sober friends, family.    Socializing with my friends.       Commit to 30 minutes of self care daily. This can be as simple as taking a shower, going for a walk, cooking a meal, reading, writing, etc. -I can also use community resources including mental health hotlines, [facility] crisis teams, or apps.      Things I can use or do for distraction: Listen to my favorite music, playing games, \"sleep it off\", go for a walk, take a deep breath.      Call a friend or " "family member. -Spend time outside.      Go for a walk. -Exercise -Do chores. -Do a project or favorite activity. -Listen to music. -Read. -Journal your feelings. -I can also download a meditation or relaxation venu, like Calm, Headspace, or Insight Timer (all three offer a free version). -A great website resource is Change to Chill with active coping skills.       Changes I can make to support my mental health and wellness: Talk to my therapist. Take prescribed medications.     Get at least 6-8 hours of sleep each night. -Eat 3 nutritious meals per day; -Attend scheduled mental health therapy and psychiatric appointments and follow all recommendations. -Maintain a daily schedule/routine. -Practice deep breathing skills. -Refrain from taking mood altering chemicals not currently prescribed to me.       People in my life that I can ask for help: My friend Vince, my parents, sober friends.      Your [facility] has a mental health crisis team you can call 24/7: [facility]  [phone]    Other things that are important when I'm in crisis: Remember that the feelings I am having right now are temporary and it won't feel like this forever. It is okay and important to ask for help.        Additional resources and information: See below:        Crisis Lines  Crisis Text Line  Text 568128  You will be connected with a trained live crisis counselor to provide support.    Por jarek, reio  RAI a 685163 o texto a 442-AYUDAME en WhatsApp    The [employer] (LGBTQ Youth Crisis Line)  1.451.859.2733  text START to 857-273      [employer]  Fast Tracker  Linking people to mental health and substance use disorder resources  fasttrackermn.org     [place] Mental Health Warm Line  Peer to peer support  Monday thru Saturday, 12 pm to 10 pm  399.991.5090 or 5.726.016.1384  Text \"Support\" to 27287    National [place] on Mental Illness (WOLF)  388.948.7675 or 1.888.WOLF.HELPS      Mental Health " Apps  My3  https://Dishcrawlpp.org/    VirtualHopeBox  https://2CRisk/apps/virtual-hope-box/      Additional Information  Today you were seen by a licensed mental health professional through Triage and Transition services, Behavioral Healthcare Providers ([facility])  for a crisis assessment in the Emergency Department at [facility].  It is recommended that you follow up with your established providers (psychiatrist, mental health therapist, and/or primary care doctor - as relevant) as soon as possible. Coordinators from [facility] will be calling you in the next 24-48 hours to ensure that you have the resources you need.  You can also contact [facility] coordinators directly at [phone]. You may have been scheduled for or offered an appointment with a mental health provider. [facility] maintains an extensive network of licensed behavioral health providers to connect patients with the services they need.  We do not charge providers a fee to participate in our referral network.  We match patients with providers based on a patient's specific needs, insurance coverage, and location.  Our first effort will be to refer you to a provider within your care system, and will utilize providers outside your care system as needed.

## 2024-03-16 ENCOUNTER — HEALTH MAINTENANCE LETTER (OUTPATIENT)
Age: 34
End: 2024-03-16

## 2024-11-27 NOTE — ED NOTES
Is This A New Presentation, Or A Follow-Up?: Skin Lesions MD brought to room to discuss recovery options. Mother of patient started to tell RN and MD that patient was having grandiose Shinto ideas, delusions, and confusion. Pt became agitated and told mom to exit the room. RN opened door for mom to leave and take patient back to room.     RN attempted to get mom's name and number after this but staff report seeing mom walk out, get in her car and leave.

## 2025-03-13 ENCOUNTER — OFFICE VISIT (OUTPATIENT)
Dept: BEHAVIORAL HEALTH | Facility: CLINIC | Age: 35
End: 2025-03-13
Payer: COMMERCIAL

## 2025-03-13 VITALS — DIASTOLIC BLOOD PRESSURE: 72 MMHG | HEART RATE: 118 BPM | SYSTOLIC BLOOD PRESSURE: 103 MMHG

## 2025-03-13 DIAGNOSIS — F11.90 OPIOID USE DISORDER: Primary | ICD-10-CM

## 2025-03-13 DIAGNOSIS — Z79.891 ENCOUNTER FOR MONITORING OPIOID MAINTENANCE THERAPY: ICD-10-CM

## 2025-03-13 DIAGNOSIS — Z30.09 ENCOUNTER FOR CONTRACEPTIVE PLANNING: ICD-10-CM

## 2025-03-13 DIAGNOSIS — K59.03 THERAPEUTIC OPIOID-INDUCED CONSTIPATION (OIC): ICD-10-CM

## 2025-03-13 DIAGNOSIS — T40.2X5A THERAPEUTIC OPIOID-INDUCED CONSTIPATION (OIC): ICD-10-CM

## 2025-03-13 DIAGNOSIS — F41.9 ANXIETY: ICD-10-CM

## 2025-03-13 DIAGNOSIS — Z51.81 ENCOUNTER FOR MONITORING OPIOID MAINTENANCE THERAPY: ICD-10-CM

## 2025-03-13 LAB
AMPHETAMINE QUAL URINE POCT: NEGATIVE
BARBITURATE QUAL URINE POCT: NEGATIVE
BENZODIAZEPINE QUAL URINE POCT: NEGATIVE
BUPRENORPHINE QUAL URINE POCT: ABNORMAL
COCAINE QUAL URINE POCT: NEGATIVE
CREAT UR-MCNC: 95 MG/DL
CREATININE QUAL URINE POCT: ABNORMAL
INTERNAL QC QUAL URINE POCT: ABNORMAL
MDMA QUAL URINE POCT: NEGATIVE
METHADONE QUAL URINE POCT: NEGATIVE
METHAMPHETAMINE QUAL URINE POCT: NEGATIVE
OPIATE QUAL URINE POCT: NEGATIVE
OXYCODONE QUAL URINE POCT: NEGATIVE
PH QUAL URINE POCT: ABNORMAL
PHENCYCLIDINE QUAL URINE POCT: NEGATIVE
POCT KIT EXPIRATION DATE: ABNORMAL
POCT KIT LOT NUMBER: ABNORMAL
SPECIFIC GRAVITY POCT: 1.02
TEMPERATURE URINE POCT: ABNORMAL
THC QUAL URINE POCT: NEGATIVE

## 2025-03-13 RX ORDER — DOCUSATE SODIUM 100 MG/1
100 CAPSULE, LIQUID FILLED ORAL 2 TIMES DAILY PRN
Qty: 60 CAPSULE | Refills: 0 | Status: SHIPPED | OUTPATIENT
Start: 2025-03-13

## 2025-03-13 RX ORDER — GABAPENTIN 600 MG/1
600 TABLET ORAL 3 TIMES DAILY
Qty: 21 TABLET | Refills: 0 | Status: SHIPPED | OUTPATIENT
Start: 2025-03-13

## 2025-03-13 RX ORDER — TOPIRAMATE 50 MG/1
50 TABLET, FILM COATED ORAL 2 TIMES DAILY
COMMUNITY

## 2025-03-13 RX ORDER — BUPRENORPHINE AND NALOXONE 8; 2 MG/1; MG/1
1 FILM, SOLUBLE BUCCAL; SUBLINGUAL 3 TIMES DAILY
Qty: 21 FILM | Refills: 0 | Status: SHIPPED | OUTPATIENT
Start: 2025-03-13

## 2025-03-13 RX ORDER — HYDROXYZINE PAMOATE 100 MG
50 CAPSULE ORAL 2 TIMES DAILY
COMMUNITY

## 2025-03-13 RX ORDER — VITAMIN B COMPLEX
TABLET ORAL DAILY
COMMUNITY

## 2025-03-13 ASSESSMENT — COLUMBIA-SUICIDE SEVERITY RATING SCALE - C-SSRS
1. IN THE PAST MONTH, HAVE YOU WISHED YOU WERE DEAD OR WISHED YOU COULD GO TO SLEEP AND NOT WAKE UP?: NO
2. HAVE YOU ACTUALLY HAD ANY THOUGHTS OF KILLING YOURSELF?: NO
6. HAVE YOU EVER DONE ANYTHING, STARTED TO DO ANYTHING, OR PREPARED TO DO ANYTHING TO END YOUR LIFE?: NO

## 2025-03-13 ASSESSMENT — PATIENT HEALTH QUESTIONNAIRE - PHQ9: SUM OF ALL RESPONSES TO PHQ QUESTIONS 1-9: 8

## 2025-03-13 NOTE — NURSING NOTE
[facility]      Rooming information:  Pt report to  released from residential today, started at [place] need a suboxone medication short term due to providers at [facility] unavailable at the time the wait is approx 1 week.Please review Medication list with Pt. Pt called [facility] to switch medical insurance back to community status startng today awaiting reply from worker.  Approximate last use of full opioid agonist: 7/2024  Taking buprenorphine? Yes: suboxone  How much per day? 8mg  Number of buprenorphine films/tablets remaining currently: 0  Side effects related to buprenorphine (constipation, dry mouth, sedation?) No   Narcan currently available: Yes  Other recent substance use:    Denies  NICOTINE-Yes: ciigs  If using nicotine, ready to quit? Yes:     Point of care urine drug screen positive for:  Lab Results   Component Value Date    BUP Screen Positive (A) 03/13/2025    BZO Negative 03/13/2025    BAR Negative 03/13/2025    STEPHANIE Negative 03/13/2025    MAMP Negative 03/13/2025    AMP Negative 03/13/2025    MDMA Negative 03/13/2025    MTD Negative 03/13/2025    UJM935 Negative 03/13/2025    OXY Negative 03/13/2025    PCP Negative 03/13/2025    THC Negative 03/13/2025    TEMP 90 F 03/13/2025    SGPOCT 1.025 03/13/2025       *POC urine drug screen does not screen for Fentanyl    Pregnancy Status   LMP: 1/2025  Birth control/barriers: no   Interested in birth control if none currently? No    Depression Response    Patient completed the PHQ-9 assessment for depression and scored >9? No  Question 9 on the PHQ-9 was positive for suicidality? No  Does patient have current mental health provider? Yes  C-SSRS screener risk level. C-SSRS Risk (Lifetime/Recent)  Calculated C-SSRS Risk Score (Lifetime/Recent): No Risk Indicated     Is this a virtual visit? No    I personally notified the following: CHICA          3/13/2025     2:00 PM   PHQ Assesment Total Score(s)   PHQ-9 Score 8         Housing  needs: [place]    Insurance: [employer]- released from half-way 3/13/25    Current legal issues: probation    Contact information up to date? Yes updated phone number    [employer] RO for [place] (please obtain TARYN if pt would like to include)    Mecca Watt MA  March 13, 2025  2:06 PM

## 2025-03-13 NOTE — PROGRESS NOTES
[facility] Recovery [facility] Initial Visit    ASSESSMENT/PLAN                                                      1. Opioid use disorder (Primary)  35 year old female using heroin/fentanyl on/off for many years. She was initially seen in  4/18/2022 and was lost to follow up until today. She has been talking suboxone 24 mg/day prescribed through [facility]. She states she has continued to struggle to maintain abstinence using fentanyl and meth, having multiple starts and stops over the years. She does has currently ~8 months from her last use. She was released from senior living today to [facility] after 3 months of incarceration. She is requesting a 7 day bridge refill until she can establish with provider at [facility].   Confirms narcan access  Proceed with programming at [facility], and follow recommendations  Continue suboxone 8 mg TID.   Return to  as needed.   - buprenorphine HCl-naloxone HCl (SUBOXONE) 8-2 MG per film; Place 1 Film under the tongue 3 times daily.  Dispense: 21 Film; Refill: 0    2. Encounter for monitoring opioid maintenance therapy  - Drugs of Abuse Screen Urine (POC CUPS) POCT; Standing  - Drug Confirmation Panel Urine with Creat - lab collect; Future  - Drugs of Abuse Screen Urine (POC CUPS) POCT  - Drug Confirmation Panel Urine with Creat - lab collect    3. Anxiety  Requesting refill of gabapentin, and would like to resume her previous effective dose of 600 mg TID, states her doses was reduced while she was incarcerated.   - gabapentin (NEURONTIN) 600 MG tablet; Take 1 tablet (600 mg) by mouth 3 times daily.  Dispense: 21 tablet; Refill: 0    4. Therapeutic opioid-induced constipation (OIC)  Providing colace to manage constipation  - docusate sodium (COLACE) 100 MG capsule; Take 1 capsule (100 mg) by mouth 2 times daily as needed for constipation.  Dispense: 60 capsule; Refill: 0    5. Encounter for contraceptive planning  Requesting referral to discuss options for OCP to manage  hormones.   - Ob/Gyn  Referral; Future           Return for Follow up with provider at [facility] 1 week, return to  if needed.      Patient counseling completed today:  Discussed mechanism of action, potential risks/benefits/side effects of medications and other recommendations above.    Discussed risk of precipitated withdrawal with initiation of buprenorphine in the presence of full opioid agonists.    Reviewed directions for initiation of buprenorphine to reduce risk of precipitated withdrawal and maximize efficacy.    Harm reduction counseling including never use alone, availability of naloxone, avoiding combination of opioids with benzodiazepines, alcohol, or other sedatives, safer administration.      Discussed importance of avoiding isolation, building a network of supportive relationships, avoiding people/places/things associated with past use to reduce risk of relapse; including motivational interviewing regarding psychosocial treatment for addiction.     SUBJECTIVE                                                    Rooming information:  Preferred name and pronouns: Liliana Rodriguez   Reason for visit: Needs bridge refill of suboxone  Last use of fentanyl or other full opioid agonist: 7/4/2024  Last dose of buprenorphine (if applicable:) 3/13/2025  Withdrawal symptoms currently: None  Other substances taken in the last 2 weeks: None  LMP (if applicable:) 1/2025  Food/housing/insurance assistance needed: None  3rd party involvement desired: None  Best phone number for patient: [phone]    Depression Response     Patient completed the PHQ-9 assessment for depression and scored >9? No  Question 9 on the PHQ-9 was positive for suicidality? No  Does patient have current mental health provider? Yes  C-SSRS screener risk level. C-SSRS Risk (Lifetime/Recent)  Calculated C-SSRS Risk Score (Lifetime/Recent): No Risk Indicated         CC/HPI:  Jennifer Stanford is a 35 year old female with PMH  Depression, anxiety, PTSD, and opioid use disorder who presents to the Recovery Clinic for initial visit.      Brief History:  Jennifer Stanford was first seen in Recovery Clinic on 4/18/2022 and was lost to follow up until presenting back to  3/13/2025. She has been taking suboxone 24 mg /day prescribed through [facility] until she was incarcerated 3 months. She was released from care home today and presented to [facility] for treatment. She is here for a suboxone bridge until she can see provider at [facility].    Substance Use History :  Opioids:   Age at first use: 14 starting using pain pills, use progressed to heroin using on/off for many years and eventually using fentanyl.   Most recent use: substance: fentanyl; quantity micrograms; route: inhaled ; timing of last use: 7/2024  Peak use 2 grams of IV heroin daily     IV drug use: Yes: Last use  2023  History of overdose: Yes: 8 overdoses  Previous residential or outpatient treatments for addiction : Yes: Multiple, currently in [facility]  Previous medication treatments for addiction: Yes: Suboxone 24 mg  Longest period of sobriety: 18 months  Medical complications related to substance use: Overdose, HCV  Hepatitis C: Chronic HCV treated with interferon; Date of most recent testing: Unknown  HIV: 6/13/2024; Date of most recent testing: Unreactive    DSM-5 OUD criteria met:  Taken in larger amounts/greater time spent in behavior over longer period of time than intended,Yes: Opioids and meth   Persistent desire or unsuccessful efforts to cut down or control use/behavior, Yes: Opioids and meth    A great deal of time is spent in activities necessary to obtain the substance/participate in the behavior or recover from its effects, Yes: Opioids and meth    Cravings, Yes: Opioids and meth    Recurrent use/behavior resulting in failure to fulfill major role obligations at work, school, or home, Yes: Opioids and meth    Continued use/behavior despite having  persistent or recurrent social or interpersonal problems caused or exacerbated by effects of use/behavior, Yes: Opioids and meth    Important social, occupational, or recreational activities are given up or reduced because of use/behavior, Yes: Opioids and meth    Recurrent use/behavior in situations in which it is physically hazardous, Yes: Opioids and meth    Continued use/behavior despite knowledge of having a persistent or recurrent physical or psychological problem that is likely to have been caused or exacerbated by use/behavior, Yes: Opioids and meth    Tolerance, Yes:     Withdrawal, Yes:      Other Addiction History:  Stimulants (cocaine, methamphetamine, MDMA/ecstasy)   Using 0.2 grams of inhaled meth, 7/2024  Sedatives/hypnotics/anxiolytics: (benzodiazepines, GHB, Ambien, phenobarbital)  Is prescribed diazepam 10 mg TID not currently taking whle in [facility]  Alcohol:   Denies  Nicotine: (cigarettes, vaping, chew/snuff)  Smokes and vapes, smoking only whie in treatment  Cannabis:   On medical marijuana  Hallucinogens/Dissociatives: (acid, mushrooms, ketamine)  Denies  Eating disorder:  Denies  Gambling:   Denies        [place] Prescription Drug Monitoring Program Reviewed:  Yes  08/29/2024 08/16/2024 1 Diazepam 10 Mg Tablet 45.00 15 Renata Kaur [id number] [facility] ([id number]) 1/1 3.00 LME Medicaid [place]   08/28/2024 08/16/2024 1 Gabapentin 600 Mg Tablet 90.00 30 Renata Kaur [id number] [facility] ([id number]) 0/1  [place]icaid [place]   08/20/2024 08/19/2024 1 Suboxone 8 Mg-2 Mg Sl Film 90.00 30 Alvina Lynn [id number] [facility] ([id number]) 0/0 24.00 mg [place]   08/16/2024 08/16/2024 1 Diazepam 10 Mg Tablet 45.00 15 Renata Kaur [id number] [facility] ([id number]) 0/1 3.00 E Medicaid [place]   08/04/2024 07/16/2024 1 Diazepam 10 Mg Tablet 45.00 15 Renata Kaur [id number] [facility] ([id number]) 1/1 3.00 LME [place]   08/01/2024 06/17/2024 1 Gabapentin 600 Mg Tablet 90.00 30 Renata Kaur [id number] [facility] ([id number]) 1/1  [place]icaid [place]   07/18/2024 07/16/2024 1 Diazepam 10 Mg Tablet 45.00 15 Renata Kaur [id number] [facility] ([id number]) 0/1 3.00 LME  [place]icaid [place]   07/17/2024 07/16/2024 1 Suboxone 8 Mg-2 Mg Sl Film 90.00 30 Alvina Hin               PAST PSYCHIATRIC HISTORY:  Diagnoses- Depression, anxety PTSD  Suicide Attempts: No   Hospitalizations: Yes November 2024          5/3/2022     1:00 PM 9/7/2022     8:00 AM 3/13/2025     2:00 PM   PHQ   PHQ-9 Total Score 5 15 8   Q9: Thoughts of better off dead/self-harm past 2 weeks Not at all Several days Not at all         Social History  Housing status:  [facility]  Education: some college  Employment status: Unemployed, not seeking work  Relationship status: Single  Children: no children  Legal: On probation        Medications:  Current Outpatient Medications   Medication Sig Dispense Refill    buprenorphine HCl-naloxone HCl (SUBOXONE) 8-2 MG per film Place 1 Film under the tongue 3 times daily. 21 Film 0    buprenorphine HCl-naloxone HCl (SUBOXONE) 8-2 MG per film Place 1 Film under the tongue 3 times daily.      docusate sodium (COLACE) 100 MG capsule Take 1 capsule (100 mg) by mouth 2 times daily as needed for constipation. 60 capsule 0    famotidine (PEPCID) 10 MG tablet Take 10 mg by mouth 2 times daily.      gabapentin (NEURONTIN) 600 MG tablet Take 1 tablet (600 mg) by mouth 3 times daily. 21 tablet 0    hydrOXYzine (VISTARIL) 100 MG capsule Take 50 mg by mouth 2 times daily.      Levothyroxine Sodium (SYNTHROID PO)       topiramate (TOPAMAX) 50 MG tablet Take 50 mg by mouth 2 times daily.      Vitamin D3 (VITAMIN D, CHOLECALCIFEROL,) 25 mcg (1000 units) tablet Take by mouth daily.      buprenorphine HCl-naloxone HCl (SUBOXONE) 8-2 MG per film Place 1 Film under the tongue 3 times daily 21 Film 0    naloxone (NARCAN) 4 MG/0.1ML nasal spray Spray 1 spray (4 mg) into one nostril alternating nostrils as needed for opioid reversal every 2-3 minutes until assistance arrives (Patient not taking: Reported on 6/27/2022) 0.2 mL 11    neomycin-polymyxin-hydrocortisone (CORTISPORIN) 3.5-26872-1 otic suspension Place 3  drops Into the left ear 4 times daily (Patient not taking: Reported on 3/13/2025) 10 mL 0    nicotine polacrilex (NICORETTE) 4 MG gum Place 1 each (4 mg) inside cheek every hour as needed for smoking cessation (Patient not taking: Reported on 6/27/2022) 220 each 1     No current facility-administered medications for this visit.     Facility-Administered Medications Ordered in Other Visits   Medication Dose Route Frequency Provider Last Rate Last Admin    Self Administer Medications: Behavioral Services   Does not apply See Admin Instructions Vicente Mays MD           Allergies   Allergen Reactions    Bee Venom     Bees     Wasp Venom Protein        Past Medical History:   Diagnosis Date    Abnormal Papanicolaou smear of cervix and cervical HPV     Benzodiazepine dependence (H)     Contact dermatitis and other eczema, due to unspecified cause     GERD (gastroesophageal reflux disease)     Gynecological examination     Hepatitis C     Hypothyroid     Hypothyroidism     Migraine     Opiate dependence (H)     Seizure (H)        Past Surgical History:   Procedure Laterality Date    SURGICAL HISTORY OF -       Tonsillectomy-Age 4       Family History   Problem Relation Age of Onset    Depression Mother     Anxiety Disorder Mother     Substance Abuse Mother     Bipolar Disorder Mother     Depression Father     Anxiety Disorder Father     Substance Abuse Father     Dementia Maternal Grandfather     Substance Abuse Brother     Depression Brother     Bipolar Disorder Sister     Substance Abuse Sister     Substance Abuse Sister     Substance Abuse Brother     Unknown/Adopted No family hx of     Suicide No family hx of     [place] Disease No family hx of     Parkinsonism No family hx of     Autism Spectrum Disorder No family hx of     Intellectual Disability No family hx of     Mental Illness No family hx of          REVIEW OF SYSTEMS:  General: Withdrawal symptoms as described below.  No recent fevers.   Eyes:  No  vision concerns.  No jaundice.    Resp: No coughing, wheezing or shortness of breath  CV: No chest pains or palpitations  GI: No complaints other than as above  : No urinary frequency or dysuria, no discharge  Musculoskeletal: No significant muscle or joint pains other than as above.  No edema  Neurologic: No numbness, tingling, weakness, problems with balance or coordination  Psychiatric: No acute concerns other than as above.   Skin: No rashes or areas of acute infection    OBJECTIVE                                                      /72   Pulse 118     Physical Exam  Cardiovascular:      Rate and Rhythm: Tachycardia present.   Pulmonary:      Effort: Pulmonary effort is normal. No respiratory distress.   Neurological:      General: No focal deficit present.      Mental Status: She is alert and oriented to person, place, and time.   Psychiatric:         Attention and Perception: Attention normal.         Mood and Affect: Mood normal.         Speech: Speech normal.         Behavior: Behavior is cooperative.         Thought Content: Thought content normal. Thought content does not include suicidal ideation.         Judgment: Judgment normal.         Labs:    UDS:   Lab Results   Component Value Date    BUP Screen Positive (A) 03/13/2025    BZO Negative 03/13/2025    BAR Negative 03/13/2025    STEPHANIE Negative 03/13/2025    MAMP Negative 03/13/2025    AMP Negative 03/13/2025    MDMA Negative 03/13/2025    MTD Negative 03/13/2025    PYC121 Negative 03/13/2025    OXY Negative 03/13/2025    PCP Negative 03/13/2025    THC Negative 03/13/2025    TEMP 90 F 03/13/2025    SGPOCT 1.025 03/13/2025       *POC urine drug screen does not screen for Fentanyl    Recent Results (from the past 720 hours)   Drugs of Abuse Screen Urine (POC CUPS) POCT    Collection Time: 03/13/25  2:44 PM   Result Value Ref Range    POCT Kit Lot Number [id number]     POCT Kit Expiration Date 9357537     Temperature Urine POCT 90 F 90 F, 92 F,  94 F, 96 F, 98 F, 100 F    Specific [place] POCT 1.025 1.005, 1.015, 1.025    pH Qual Urine POCT 5 pH 4 pH, 5 pH, 7 pH, 9 pH    Creatinine Qual Urine POCT 100 mg/dL 20 mg/dL, 50 mg/dL, 100 mg/dL, 200 mg/dL    Internal QC Qual Urine POCT Valid Valid    Amphetamine Qual Urine POCT Negative Negative    Barbiturate Qual Urine POCT Negative Negative    Buprenorphine Qual Urine POCT Screen Positive (A) Negative    Benzodiazepine Qual Urine POCT Negative Negative    Cocaine Qual Urine POCT Negative Negative    Methamphetamine Qual Urine POCT Negative Negative    MDMA Qual Urine POCT Negative Negative    Methadone Qual Urine POCT Negative Negative    Opiate Qual Urine POCT Negative Negative    Oxycodone Qual Urine POCT Negative Negative    Phencyclidine Qual Urine POCT Negative Negative    THC Qual Urine POCT Negative Negative                   Jenna Hough, CNP      [place] [id number], [address] [address] [phone]

## 2025-03-17 LAB
BUPRENORPHINE UR CFM-MCNC: 238 NG/ML
BUPRENORPHINE/CREAT UR: 251 NG/MG {CREAT}
GABAPENTIN UR QL CFM: PRESENT
NALOXONE UR CFM-MCNC: 601 NG/ML
NALOXONE: 633 NG/MG {CREAT}
NORBUPRENORPHINE UR CFM-MCNC: 625 NG/ML
NORBUPRENORPHINE/CREAT UR: 658 NG/MG {CREAT}

## 2025-03-20 NOTE — PROGRESS NOTES
[facility] Recovery [facility] Individual Session Summary     Session Start Time: 2:38 pm     Session End Time: 2:52 pm     Duration:14 minutes      DATA: Peer  met with Jennifer QUE TineoStanford in the Recovery Clinic to introduce himself and to provide patient further support and resources for their recovery.  Engaged in a discussion regarding where pt is at in terms of their recovery.  Pt stated they are 8 mos clean and sober, spent 3 mos in long term and 5 months outside of long term staying clean has been difficult but worth it.  Pt shared that she has no kids and is expecting a $30,000.00 retroactive SSDI check as she was approved for SSDI.    Pt stated she is very hopeful for her future and plans to buy a new car or trailer.    Intervention: Facilitated an individual session, utilized active listening, provided validation, utilized motivational interviewing techniques, provided shared experience.    Assessment: Patient appeared calm, collected and communicative    Plan: Peer  will continue to provide support as needed. Provided patient with business card to pt encouraging pt to reach out to peer  if needed additional support and resources.        Patient Identified Goals  To continue to utilize their suboxone as prescribed., To attend and participate in a sober support group meeting., To continue to take my other medications as prescribed., and To establish stable housing.    Support Needs:   Ongoing care, support and resources for opioid substance use disorder as well as other substances.       Key Risk Factors to Recovery:   PRC encourages being aware of risk factors that can lead to re-use which include avoiding isolation, avoiding triggers and managing cravings in a healthy manner. being open and willing to acceptance and change on a daily basis.  PRC encourages pt to establish a sober network calling tree to reach out to when needed.  Continue to practice honesty with  ourselves and trusted support person(s).   PRC encourages regular attendance at recovery based meetings as well as finding a sponsor for mentoring and accountability.   PRC encourages consideration of other services such as counseling for mental health issues which can correlate with our substance use.      Kenrick Seymour  March 20, 2025  10:32 AM    Attestation: Sylvia Olivia, [facility], [facility] Provides oversight and supervision of care.      [facility]  [address], [address]   [place], [place], [address]  Clinic Phone: [phone]  Clinic Fax: [phone]    Open Monday - Friday  9:00am-4:00pm  Walk in hours: 9am-3pm

## 2025-07-26 ENCOUNTER — HEALTH MAINTENANCE LETTER (OUTPATIENT)
Age: 35
End: 2025-07-26